# Patient Record
Sex: FEMALE | Race: WHITE | Employment: OTHER | URBAN - METROPOLITAN AREA
[De-identification: names, ages, dates, MRNs, and addresses within clinical notes are randomized per-mention and may not be internally consistent; named-entity substitution may affect disease eponyms.]

---

## 2020-01-08 ENCOUNTER — OFFICE VISIT (OUTPATIENT)
Dept: ORTHOPEDIC SURGERY | Age: 80
End: 2020-01-08
Payer: MEDICARE

## 2020-01-08 VITALS — TEMPERATURE: 98 F | HEIGHT: 63 IN | BODY MASS INDEX: 24.45 KG/M2 | WEIGHT: 138 LBS

## 2020-01-08 PROCEDURE — G8420 CALC BMI NORM PARAMETERS: HCPCS | Performed by: ORTHOPAEDIC SURGERY

## 2020-01-08 PROCEDURE — 4004F PT TOBACCO SCREEN RCVD TLK: CPT | Performed by: ORTHOPAEDIC SURGERY

## 2020-01-08 PROCEDURE — 1090F PRES/ABSN URINE INCON ASSESS: CPT | Performed by: ORTHOPAEDIC SURGERY

## 2020-01-08 PROCEDURE — 1123F ACP DISCUSS/DSCN MKR DOCD: CPT | Performed by: ORTHOPAEDIC SURGERY

## 2020-01-08 PROCEDURE — 4040F PNEUMOC VAC/ADMIN/RCVD: CPT | Performed by: ORTHOPAEDIC SURGERY

## 2020-01-08 PROCEDURE — 99203 OFFICE O/P NEW LOW 30 MIN: CPT | Performed by: ORTHOPAEDIC SURGERY

## 2020-01-08 PROCEDURE — G8400 PT W/DXA NO RESULTS DOC: HCPCS | Performed by: ORTHOPAEDIC SURGERY

## 2020-01-08 PROCEDURE — G8427 DOCREV CUR MEDS BY ELIG CLIN: HCPCS | Performed by: ORTHOPAEDIC SURGERY

## 2020-01-08 PROCEDURE — 27810 TREATMENT OF ANKLE FRACTURE: CPT | Performed by: ORTHOPAEDIC SURGERY

## 2020-01-08 PROCEDURE — G8484 FLU IMMUNIZE NO ADMIN: HCPCS | Performed by: ORTHOPAEDIC SURGERY

## 2020-01-08 RX ORDER — HYDROCODONE BITARTRATE AND ACETAMINOPHEN 5; 325 MG/1; MG/1
TABLET ORAL
COMMUNITY
Start: 2020-01-04 | End: 2021-02-21 | Stop reason: ALTCHOICE

## 2020-01-08 RX ORDER — HYDROCODONE BITARTRATE AND ACETAMINOPHEN 5; 325 MG/1; MG/1
1 TABLET ORAL EVERY 6 HOURS PRN
Qty: 28 TABLET | Refills: 0 | Status: SHIPPED | OUTPATIENT
Start: 2020-01-08 | End: 2020-01-15

## 2020-01-08 NOTE — PROGRESS NOTES
Chief Complaint   Patient presents with    Ankle Pain     Right ankle, had a fall DOI 1/2/2020, [facility] [address] is a 70 y.o. female who presents today with a right ankle injury. There are injury occurred on 1/2/2020 patient tripped over herself. Patient was initially seen at [facility] and was provided a posterior splint. Patient follows up 1 week since the time of injury. Patient states that no reduction was attempted in the emergency department. But she was provided pain medication. Past Medical History:   Diagnosis Date    Hiatal hernia     Hyperlipidemia     Hypertension      Past Surgical History:   Procedure Laterality Date    BLADDER REPAIR      x3    COLONOSCOPY      ENDOSCOPY, COLON, DIAGNOSTIC      HERNIA REPAIR      HYSTERECTOMY      OTHER SURGICAL HISTORY  11/15/12    tenovaginotomy left ring finger    SKIN BIOPSY      TONSILLECTOMY         Current Outpatient Medications:     HYDROcodone-acetaminophen (NORCO) 5-325 MG per tablet, TAKE ONE TABLET BY MOUTH EVERY 8 HOURS AS NEEDED FOR SEVERE PAIN (PAIN SCALE 7-10), Disp: , Rfl:     HYDROcodone-acetaminophen (NORCO) 5-325 MG per tablet, Take 1 tablet by mouth every 6 hours as needed for Pain for up to 7 days. , Disp: 28 tablet, Rfl: 0    BIOTIN PO, Take by mouth, Disp: , Rfl:     sertraline (ZOLOFT) 100 MG tablet, Take 25 mg by mouth daily , Disp: , Rfl:     amLODIPine (NORVASC) 10 MG tablet, Take 5 mg by mouth 2 times daily. , Disp: , Rfl:     omeprazole (PRILOSEC) 40 MG capsule, Take 40 mg by mouth daily. , Disp: , Rfl:     simvastatin (ZOCOR) 20 MG tablet, Take 20 mg by mouth nightly.  , Disp: , Rfl:     Calcium Carb-Cholecalciferol (CALCIUM 600+D3 PO), Take 1 tablet by mouth 2 times daily. , Disp: , Rfl:     Multiple Vitamins-Minerals (CENTRUM SILVER) TABS, Take 1 tablet by mouth daily.   , Disp: , Rfl:     Vitamin D (CHOLECALCIFEROL) 1000 UNITS CAPS capsule, Take 1,000 Units by mouth 2 times daily.  , Disp: , Rfl:   Allergies   Allergen Reactions    Aspirin Anaphylaxis    Propoxyphene     Darvocet [Propoxyphene N-Acetaminophen] Nausea And Vomiting    Demerol Nausea And Vomiting     Social History     Socioeconomic History    Marital status:      Spouse name: Not on file    Number of children: Not on file    Years of education: Not on file    Highest education level: Not on file   Occupational History    Not on file   Social Needs    Financial resource strain: Not on file    Food insecurity:     Worry: Not on file     Inability: Not on file    Transportation needs:     Medical: Not on file     Non-medical: Not on file   Tobacco Use    Smoking status: Never Smoker   Substance and Sexual Activity    Alcohol use: No    Drug use: Not on file    Sexual activity: Not on file   Lifestyle    Physical activity:     Days per week: Not on file     Minutes per session: Not on file    Stress: Not on file   Relationships    Social connections:     Talks on phone: Not on file     Gets together: Not on file     Attends Holiness service: Not on file     Active member of club or organization: Not on file     Attends meetings of clubs or organizations: Not on file     Relationship status: Not on file    Intimate partner violence:     Fear of current or ex partner: Not on file     Emotionally abused: Not on file     Physically abused: Not on file     Forced sexual activity: Not on file   Other Topics Concern    Not on file   Social History Narrative    Not on file     No family history on file. REVIEW OF SYSTEMS:     General/Constitution:  (-)weight loss, (-)fever, (-)chills, (-)weakness. Skin: (-) rash,(-) psoriasis,(-) eczema, (-)skin cancer. Musculoskeletal: (-) fractures,  (-) dislocations,(-) collagen vascular disease, (-) fibromyalgia, (-) multiple sclerosis, (-) muscular dystrophy, (-) RSD,(-) joint pain (-)swelling, (-) joint pain,swelling.   Neurologic: (-) epilepsy, Musculoskeletal:    Gait: antalgic; examination of the nails and digits reveal no cyanosis or clubbing. Knee exam - bilateral knee exam shows;  range of motion of R. Knee is 0 to 140, and L. Knee is 0 to 140. The patient does not have  pain on motion, there is not an effusion, there is not tenderness over the  global region, there are not any masses, there is not ligamentous instability, there is not  deformity noted. Knee exam: the injured knee reveals normal exam, no swelling, tenderness, instability; ligaments intact, FROM. Ankle Exam:    Upon inspection and palpation of the Right ankle,  there is not deformity noted,  moderate swelling, moderate ecchymosis, has pain on palpation of distal fibula. ROM R: Limited Secondary to pain; Left ankle : DF20; PF 40;  INV 30, MONIE 10. This exam was compared bilaterally. Right Ankle:   (-) Anterior Drawer ,  (-) Posterior Drawer ,  (-) Squeeze test,  (-) External Rotation, (-) Eversion test , (-) Alberto Test     Left ankle:   (-) Anterior Drawer ,(-)  Posterior Drawer ,(-) Squeeze test,(-) External Rotation (-) Eversion test, (-) Alberto Test.      Foot exam- visual inspection reveals warm, good capillary refill, there is not pain to palpation over the metatarsals. ROM inversion/eversion full range of motion, abduction/adduction full range of motion, ROM in MTP/PIP/DIP full range of motion. Xrays:    Ct Ankle Right Wo Contrast    Result Date: 1/8/2020  LOCATION: 200 EXAM: CT ANKLE RIGHT WO CONTRAST COMPARISON: None HISTORY: Ankle fracture TECHNIQUE: Noncontrast CT images obtained of the right ankle without contrast. Coronal and sagittal reformatted images obtained. FINDINGS: Comminuted fracture of the lateral malleolus is identified extending into the tibiofibular joint. There is also a fracture of the posterior malleolus. No fracture of the medial malleolus identified. There is tibiotalar disruption with abnormal alignment.  No other fractures noted. Bimalleolar fracture dislocation involving the posterior and lateral malleolus. Radiographic findings reviewed with patient      Impression:  Edwin Orozco was seen today for ankle pain. Diagnoses and all orders for this visit:    Closed bimalleolar fracture of right ankle, initial encounter  -     CT ANKLE RIGHT WO CONTRAST; Future  -     HYDROcodone-acetaminophen (NORCO) 5-325 MG per tablet; Take 1 tablet by mouth every 6 hours as needed for Pain for up to 7 days. -     Mukesh Betancourt, Jostin Worrell DO, Orthopaedics, [facility]        Patient seen examined. X-rays reviewed. Patient had a bimalleolar equivalent ankle fracture noted on the coronal films with subluxation. Lateral films revealed possible posterior malleolar component. Portion of patient was only provided a posterior splint 1 week ago. Therefore patient was converted to a 3 sided well molded fiberglass splint with manipulation at the fracture sites to maintain alignment. CT scan was therefore ordered today showing persistent mild subluxation. Due to the involvement of the posterior malleolar component, is recommended patient will follow-up with orthopedic trauma for further evaluation management. Patient's pain medication was renewed today. Plan:Natural history and expected course discussed. Questions answered. Rest, ice, compression, elevation (RICE) therapy.

## 2020-01-09 ENCOUNTER — TELEPHONE (OUTPATIENT)
Dept: ORTHOPEDIC SURGERY | Age: 80
End: 2020-01-09

## 2020-01-14 ENCOUNTER — TELEPHONE (OUTPATIENT)
Dept: ORTHOPEDIC SURGERY | Age: 80
End: 2020-01-14

## 2020-03-12 ENCOUNTER — HOSPITAL ENCOUNTER (OUTPATIENT)
Age: 80
Discharge: HOME | End: 2020-03-14
Payer: MEDICARE

## 2020-03-12 PROCEDURE — 81003 URINALYSIS AUTO W/O SCOPE: CPT

## 2020-03-12 PROCEDURE — 87088 URINE BACTERIA CULTURE: CPT

## 2020-03-13 LAB
BILIRUBIN URINE: NEGATIVE
BLOOD, URINE: NEGATIVE
CLARITY: CLEAR
COLOR: YELLOW
GLUCOSE URINE: NEGATIVE MG/DL
KETONES, URINE: NEGATIVE MG/DL
LEUKOCYTE ESTERASE, URINE: NEGATIVE
NITRITE, URINE: NEGATIVE
PH UA: 5.5 (ref 5–9)
PROTEIN UA: NEGATIVE MG/DL
SPECIFIC GRAVITY UA: 1.02 (ref 1–1.03)
UROBILINOGEN, URINE: 0.2 E.U./DL

## 2020-03-14 LAB — URINE CULTURE, ROUTINE: NORMAL

## 2021-02-21 ENCOUNTER — HOSPITAL ENCOUNTER (EMERGENCY)
Age: 81
Discharge: HOME | End: 2021-02-21
Payer: MEDICARE

## 2021-02-21 VITALS
WEIGHT: 142 LBS | DIASTOLIC BLOOD PRESSURE: 88 MMHG | OXYGEN SATURATION: 95 % | RESPIRATION RATE: 20 BRPM | BODY MASS INDEX: 26.13 KG/M2 | HEART RATE: 92 BPM | SYSTOLIC BLOOD PRESSURE: 144 MMHG | HEIGHT: 62 IN | TEMPERATURE: 98.1 F

## 2021-02-21 DIAGNOSIS — J06.9 VIRAL URI: Primary | ICD-10-CM

## 2021-02-21 PROCEDURE — 99211 OFF/OP EST MAY X REQ PHY/QHP: CPT

## 2021-02-21 RX ORDER — PANTOPRAZOLE SODIUM 40 MG/1
40 TABLET, DELAYED RELEASE ORAL DAILY
COMMUNITY

## 2021-02-21 RX ORDER — LOSARTAN POTASSIUM AND HYDROCHLOROTHIAZIDE 12.5; 5 MG/1; MG/1
1 TABLET ORAL DAILY
COMMUNITY

## 2021-02-21 RX ORDER — ALPRAZOLAM 0.25 MG/1
0.25 TABLET ORAL NIGHTLY PRN
COMMUNITY

## 2021-02-21 RX ORDER — LORATADINE 10 MG/1
10 TABLET ORAL DAILY
Qty: 7 TABLET | Refills: 0 | Status: SHIPPED | OUTPATIENT
Start: 2021-02-21 | End: 2021-02-28

## 2021-02-21 ASSESSMENT — PAIN DESCRIPTION - PROGRESSION: CLINICAL_PROGRESSION: GRADUALLY WORSENING

## 2021-02-21 ASSESSMENT — PAIN DESCRIPTION - PAIN TYPE: TYPE: ACUTE PAIN

## 2021-02-21 NOTE — ED PROVIDER NOTES
[address] [facility]  Department of Emergency Medicine   ED  Encounter Note  Admit Date/RoomTime: 2021 12:11 PM  ED Room:     NAME: Jie Londono  : 1940  MRN: [id number]     Chief Complaint:  Pharyngitis, Nasal Congestion, Cough, and Eye Problem (States both eyes watery.)    History of Present Illness       Jie Londono is a 68 y.o. old female who presents to the emergency department complaining of an upper respiratory infection for the past 2 to 3 days. Patient states for a few days now she has had sinus congestion. Nasal drainage. Watery eyes. She has had a little bit of a cough from the drainage down the back of her throat. Patient states she feels foggy in the head. At home she tried an over-the-counter cough and cold medicine x1, did not like it so she did not take it again. She denies any fevers or chills. Denies loss of taste, loss of smell. Denies sore throat. She denies shortness of breath. Does admit to a little bit of nausea the other day. Denies any vomiting or diarrhea. She has no concerns for coronavirus. ROS   Pertinent positives and negatives are stated within HPI, all other systems reviewed and are negative. Past Medical History:  has a past medical history of Depression, Hiatal hernia, Hyperlipidemia, and Hypertension. Surgical History:  has a past surgical history that includes Tonsillectomy; hernia repair; bladder repair; Hysterectomy; Colonoscopy; Endoscopy, colon, diagnostic; skin biopsy; other surgical history (11/15/12); and fracture surgery. Social History:  reports that she has never smoked. She has never used smokeless tobacco. She reports that she does not drink alcohol. Family History: family history is not on file.      Allergies: Aspirin, Propoxyphene, Darvocet [propoxyphene n-acetaminophen], and Demerol    Physical Exam   Oxygen Saturation Interpretation: Normal.        ED Triage Vitals [21 1213]   BP Temp Temp Source Pulse Resp SpO2 Height Weight   (!) 144/88 98.1 °F (36.7 °C) Infrared 92 20 95 % 5' 2\" (1.575 m) 142 lb (64.4 kg)         General:  NAD. Alert and Oriented. Well-appearing. Skin:  Warm, dry. No rashes. Head:  Normocephalic. Atraumatic. Eyes:  EOMI. Conjunctiva normal.  ENT:  Oral mucosa moist.  Airway patent. Posterior pharynx pink without erythema, without swelling, without exudate. Uvula midline with equal rise and without edema. Clear postnasal drainage. Bilateral ear canals patent with minimal cerumen. Bilateral TM's without erythema, without bulging. Nasal turbinates with moderate swelling and clear drainage. Frontal and maxillary sinuses nontender to palpation. Neck:  Supple. Normal ROM. Respiratory:  No respiratory distress. No labored breathing. Lungs clear without rales, rhonchi or wheezing. Cardiovascular:  Regular rate. No Murmur. No peripheral edema. Extremities warm and good color. Extremities:  Normal ROM. Nontender to palpation. Atraumatic. Back:  Normal ROM. Nontender to palpation. Neuro:  Alert and Oriented to person, place, time and situation. Normal LOC. Moves all extremities. Speech fluent. Psych:  Calm and Cooperative. Normal thought process. Normal judgement. Lab / Imaging Results   (All laboratory and radiology results have been personally reviewed by myself)  Labs:  No results found for this visit on 02/21/21. Imaging: All Radiology results interpreted by Radiologist unless otherwise noted. No orders to display       ED Course / Medical Decision Making   Medications - No data to display     Re-examination:  2/21/21       Time:    Patients condition . Consults:   None    Procedures:   none    Medical Decision Making:    Based on low suspicion for pneumonia as per history/physical findings, imaging was not done.  Based on low suspicion for COVID-19, testing was not done.       Upper respiratory infection is likely to  be viral in etiology. Antibiotics are not indicated at this time based on clinical presentation and physical findings. She is not hypoxic. Patient is well appearing, non toxic and appropriate for outpatient management. Patient states she does not like to take any liquid cough or cold medicine. She is willing to try Claritin or guaifenesin tablets. Plan of Care/Counseling:  I reviewed today's visit with the patient in addition to providing specific details for the plan of care and counseling regarding the diagnosis and prognosis. Questions are answered at this time and are agreeable with the plan. Assessment     1. Viral URI New Problem     Plan   Discharge home. Patient condition is good    New Medications     New Prescriptions    LORATADINE (CLARITIN) 10 MG TABLET    Take 1 tablet by mouth daily for 7 days     Electronically signed by WINDY Ross   DD: 2/21/21  **This report was transcribed using voice recognition software. Every effort was made to ensure accuracy; however, inadvertent computerized transcription errors may be present.   END OF ED PROVIDER NOTE         Marquis Ross  02/21/21 [id number]

## 2022-04-03 ENCOUNTER — HOSPITAL ENCOUNTER (EMERGENCY)
Age: 82
Discharge: HOME | End: 2022-04-03
Payer: MEDICARE

## 2022-04-03 ENCOUNTER — APPOINTMENT (OUTPATIENT)
Dept: GENERAL RADIOLOGY | Age: 82
End: 2022-04-03
Payer: MEDICARE

## 2022-04-03 VITALS
RESPIRATION RATE: 16 BRPM | BODY MASS INDEX: 24.69 KG/M2 | WEIGHT: 135 LBS | OXYGEN SATURATION: 98 % | SYSTOLIC BLOOD PRESSURE: 143 MMHG | TEMPERATURE: 97.7 F | HEART RATE: 91 BPM | DIASTOLIC BLOOD PRESSURE: 74 MMHG

## 2022-04-03 DIAGNOSIS — M76.52 PATELLAR TENDINITIS, LEFT KNEE: Primary | ICD-10-CM

## 2022-04-03 PROCEDURE — 73562 X-RAY EXAM OF KNEE 3: CPT

## 2022-04-03 PROCEDURE — 99211 OFF/OP EST MAY X REQ PHY/QHP: CPT

## 2022-04-03 RX ORDER — CEPHALEXIN 500 MG/1
500 CAPSULE ORAL 3 TIMES DAILY
Qty: 30 CAPSULE | Refills: 0 | Status: SHIPPED | OUTPATIENT
Start: 2022-04-03 | End: 2022-04-13

## 2022-04-03 NOTE — ED PROVIDER NOTES
[address] [facility]  Department of Emergency Medicine   ED  Encounter Note  Admit Date/RoomTime: 4/3/2022  1:27 PM  ED Room:   NAME: Ana Galdamez  : 1940  MRN: [id number]     Chief Complaint:  Knee Pain (left knee cap, is red, hot, swollen, noticed it last night, no injury)    HISTORY OF PRESENT ILLNESS        Ana Galdamez is a 80 y.o. female who presents to the ED with complaint of left knee redness and swelling. Patient states yesterday she noted that her left knee had a bump to the front of it. Shortly after that it became very red, warm and swollen. It is tender to the touch. Patient does admit couple days before that she was down on her hands and knees cleaning. She denies any fevers or chills. Denies any body aches. Denies any injury to her left knee. Symptoms are mild in severity. ROS   Pertinent positives and negatives are stated within HPI, all other systems reviewed and are negative. Past Medical History:  has a past medical history of Depression, Hiatal hernia, Hyperlipidemia, and Hypertension. Surgical History:  has a past surgical history that includes Tonsillectomy; hernia repair; bladder repair; Hysterectomy; Colonoscopy; Endoscopy, colon, diagnostic; skin biopsy; other surgical history (11/15/12); and fracture surgery. Social History:  reports that she has never smoked. She has never used smokeless tobacco. She reports that she does not drink alcohol. Family History: family history is not on file. Allergies: Aspirin, Propoxyphene, Darvocet [propoxyphene n-acetaminophen], and Demerol    PHYSICAL EXAM   Oxygen Saturation Interpretation: Normal on room air analysis. ED Triage Vitals   BP Temp Temp src Pulse Resp SpO2 Height Weight   22 1254 22 1254 -- 22 1254 22 1254 22 1254 -- 22 1252   (!) 143/74 97.7 °F (36.5 °C)  91 16 98 %  135 lb (61.2 kg)       General:  NAD. Alert and Oriented. Well-appearing.   Skin: Warm, dry. No rashes. Head:  Normocephalic. Atraumatic. Eyes:  EOMI. Conjunctiva normal.  ENT:  Oral mucosa moist.  Airway patent. Neck:  Supple. Normal ROM. Respiratory:  No respiratory distress. No labored breathing. Lungs clear without rales, rhonchi or wheezing. Cardiovascular:  Regular rate. No peripheral edema. Extremities warm and good color. Extremities:    Left knee with anterior swelling overlying the patella and patellar tendon. It is swollen and tender to palpation. Warm to the touch. Left knee overall is not swollen and there is no effusion or ballottement. Flexion and extension is intact to the left knee. Left calf is not swollen and nontender to palpation. Back:  Normal ROM. Nontender to palpation. Neuro:  Alert and Oriented to person, place, time and situation. Normal LOC. Moves all extremities. Speech fluent. Psych:  Calm and Cooperative. Normal thought process. Normal judgement. Lab / Imaging Results   (All laboratory and radiology results have been personally reviewed by myself)  Labs:  No results found for this visit on 04/03/22. Imaging: All Radiology results interpreted by Radiologist unless otherwise noted. XR KNEE LEFT (3 VIEWS)   Final Result   No acute abnormality of the knee. ED Course / Medical Decision Making   Medications - No data to display     Re-examination:  4/3/22       Time:   Patients condition . Consult(s):   None    Procedure(s):   none    MDM:   Discussed with patient that this looks like a patellar tendinitis. I will cover her on Keflex antibiotic. With her aspirin allergy patient states she cannot take any Motrin products. Advised to rest, ice and elevation. Plan of Care/Counseling:  Physician Assistant on duty reviewed today's visit with the patient in addition to providing specific details for the plan of care and counseling regarding the diagnosis and prognosis.   Questions are answered at this time and are agreeable with the plan. ASSESSMENT     1. Patellar tendinitis, left knee New Problem     PLAN   Discharged home. Patient condition is good    New Medications     New Prescriptions    CEPHALEXIN (KEFLEX) 500 MG CAPSULE    Take 1 capsule by mouth 3 times daily for 10 days     Electronically signed by IWNDY Zelaya   DD: 4/3/22  **This report was transcribed using voice recognition software. Every effort was made to ensure accuracy; however, inadvertent computerized transcription errors may be present.   END OF ED PROVIDER NOTE       Marquis Zelaya  04/03/22 [id number]

## 2022-10-13 ENCOUNTER — INITIAL CONSULT (OUTPATIENT)
Dept: NEUROSURGERY | Age: 82
End: 2022-10-13
Payer: MEDICARE

## 2022-10-13 VITALS
OXYGEN SATURATION: 95 % | WEIGHT: 135 LBS | TEMPERATURE: 97.1 F | SYSTOLIC BLOOD PRESSURE: 129 MMHG | DIASTOLIC BLOOD PRESSURE: 78 MMHG | BODY MASS INDEX: 24.84 KG/M2 | RESPIRATION RATE: 20 BRPM | HEIGHT: 62 IN | HEART RATE: 109 BPM

## 2022-10-13 DIAGNOSIS — M54.41 ACUTE MIDLINE LOW BACK PAIN WITH BILATERAL SCIATICA: Primary | ICD-10-CM

## 2022-10-13 DIAGNOSIS — M54.42 ACUTE MIDLINE LOW BACK PAIN WITH BILATERAL SCIATICA: Primary | ICD-10-CM

## 2022-10-13 PROCEDURE — G8427 DOCREV CUR MEDS BY ELIG CLIN: HCPCS | Performed by: NEUROLOGICAL SURGERY

## 2022-10-13 PROCEDURE — G8484 FLU IMMUNIZE NO ADMIN: HCPCS | Performed by: NEUROLOGICAL SURGERY

## 2022-10-13 PROCEDURE — 99204 OFFICE O/P NEW MOD 45 MIN: CPT | Performed by: NEUROLOGICAL SURGERY

## 2022-10-13 PROCEDURE — 1036F TOBACCO NON-USER: CPT | Performed by: NEUROLOGICAL SURGERY

## 2022-10-13 PROCEDURE — 99202 OFFICE O/P NEW SF 15 MIN: CPT

## 2022-10-13 PROCEDURE — 1090F PRES/ABSN URINE INCON ASSESS: CPT | Performed by: NEUROLOGICAL SURGERY

## 2022-10-13 PROCEDURE — G8420 CALC BMI NORM PARAMETERS: HCPCS | Performed by: NEUROLOGICAL SURGERY

## 2022-10-13 PROCEDURE — G8400 PT W/DXA NO RESULTS DOC: HCPCS | Performed by: NEUROLOGICAL SURGERY

## 2022-10-13 PROCEDURE — 1123F ACP DISCUSS/DSCN MKR DOCD: CPT | Performed by: NEUROLOGICAL SURGERY

## 2022-10-13 RX ORDER — SIMVASTATIN 80 MG
TABLET ORAL
COMMUNITY
Start: 2022-09-09

## 2022-10-13 RX ORDER — VALSARTAN AND HYDROCHLOROTHIAZIDE 80; 12.5 MG/1; MG/1
TABLET, FILM COATED ORAL
COMMUNITY
Start: 2022-10-10

## 2022-10-13 ASSESSMENT — ENCOUNTER SYMPTOMS
EYES NEGATIVE: 1
GASTROINTESTINAL NEGATIVE: 1
BACK PAIN: 1
RESPIRATORY NEGATIVE: 1
ALLERGIC/IMMUNOLOGIC NEGATIVE: 1

## 2022-10-13 NOTE — PROGRESS NOTES
Flex Stoner (:  1940) is a [de-identified] y.o. female,New patient, here for evaluation of the following chief complaint(s):  Back Pain (Back pain is constant, pt has had PT and injections years ago with no relief)         ASSESSMENT/PLAN:  1. Acute midline low back pain with bilateral sciatica  [de-identified]year old lady who presents with back and leg pain. She has failed over 3 months of physical therapy and epidural injections. 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 I am recommending an L4-L5 laminectomy and right diskectomy       No follow-ups on file. Subjective   SUBJECTIVE/OBJECTIVE:  HPI  [de-identified]year old lady who presents with back and bilateral leg pain. The pain is made worse with activity and better with rest.  The pain is rated as a 9/10. She admits to numbness and tinging in both legs. She denies loss of control of bowel or bladder function. She has tried physical therapy and epidural steroid injections. The pain is worse in the morning    Review of Systems   Constitutional: Negative. HENT: Negative. Eyes: Negative. Respiratory: Negative. Cardiovascular: Negative. Gastrointestinal: Negative. Endocrine: Negative. Genitourinary: Negative. Musculoskeletal:  Positive for back pain and joint swelling. Skin: Negative. Allergic/Immunologic: Negative. Neurological: Negative. Hematological:  Bruises/bleeds easily. Objective   Physical Exam  HENT:      Head: Normocephalic and atraumatic. Nose: Nose normal.   Eyes:      General: No visual field deficit. Right eye: No discharge. Left eye: No discharge. Extraocular Movements: Extraocular movements intact. Conjunctiva/sclera: Conjunctivae normal.      Pupils: Pupils are equal, round, and reactive to light. Pulmonary:      Effort: Pulmonary effort is normal. No respiratory distress. Abdominal:      General: Abdomen is flat. There is no distension.    Musculoskeletal: General: No swelling, tenderness, deformity or signs of injury. Normal range of motion. Right lower leg: No edema. Skin:     Capillary Refill: Capillary refill takes less than 2 seconds. Coloration: Skin is not jaundiced or pale. Findings: No bruising, erythema, lesion or rash. Neurological:      General: No focal deficit present. Mental Status: She is oriented to person, place, and time. Cranial Nerves: Cranial nerves 2-12 are intact. No cranial nerve deficit, dysarthria or facial asymmetry. Sensory: Sensation is intact. No sensory deficit. Motor: Weakness present. No tremor, atrophy, abnormal muscle tone, seizure activity or pronator drift. Coordination: Coordination is intact. Romberg sign negative. Coordination normal.      Gait: Gait normal.      Deep Tendon Reflexes: Reflexes normal. Babinski sign absent on the right side. Babinski sign absent on the left side. Reflex Scores:       Tricep reflexes are 2+ on the right side and 2+ on the left side. Bicep reflexes are 2+ on the right side and 2+ on the left side. Brachioradialis reflexes are 2+ on the right side and 2+ on the left side. Patellar reflexes are 2+ on the right side and 2+ on the left side. Achilles reflexes are 2+ on the right side and 2+ on the left side. Comments: 4/5 in RLE   Psychiatric:         Mood and Affect: Mood normal.         Behavior: Behavior normal.         Thought Content: Thought content normal.         Judgment: Judgment normal.            On this date 10/13/2022 I have spent 45 minutes reviewing previous notes, test results and face to face with the patient discussing the diagnosis and importance of compliance with the treatment plan as well as documenting on the day of the visit. An electronic signature was used to authenticate this note.     --Ludy Titus MD

## 2022-10-19 ENCOUNTER — PREP FOR PROCEDURE (OUTPATIENT)
Dept: NEUROSURGERY | Age: 82
End: 2022-10-19

## 2022-10-19 PROBLEM — M51.16 LUMBAR DISC HERNIATION WITH RADICULOPATHY: Status: ACTIVE | Noted: 2022-10-19

## 2022-10-24 ENCOUNTER — PREP FOR PROCEDURE (OUTPATIENT)
Dept: NEUROSURGERY | Age: 82
End: 2022-10-24

## 2022-10-24 DIAGNOSIS — Z01.818 PRE-OP TESTING: Primary | ICD-10-CM

## 2022-10-24 RX ORDER — SODIUM CHLORIDE 0.9 % (FLUSH) 0.9 %
5-40 SYRINGE (ML) INJECTION EVERY 12 HOURS SCHEDULED
Status: CANCELLED | OUTPATIENT
Start: 2022-10-24

## 2022-10-24 RX ORDER — SODIUM CHLORIDE 9 MG/ML
INJECTION, SOLUTION INTRAVENOUS PRN
Status: CANCELLED | OUTPATIENT
Start: 2022-10-24

## 2022-10-24 RX ORDER — SODIUM CHLORIDE 0.9 % (FLUSH) 0.9 %
5-40 SYRINGE (ML) INJECTION PRN
Status: CANCELLED | OUTPATIENT
Start: 2022-10-24

## 2022-10-24 RX ORDER — SODIUM CHLORIDE 9 MG/ML
INJECTION, SOLUTION INTRAVENOUS CONTINUOUS
Status: CANCELLED | OUTPATIENT
Start: 2022-10-24

## 2022-11-08 RX ORDER — BUPROPION HYDROCHLORIDE 150 MG/1
150 TABLET ORAL EVERY EVENING
COMMUNITY

## 2022-11-08 RX ORDER — ASCORBIC ACID 500 MG
1000 TABLET ORAL DAILY
COMMUNITY

## 2022-11-08 NOTE — PROGRESS NOTES
[address]   PRE-ADMISSION TESTING GENERAL INSTRUCTIONS  PAT Phone Number: [phone]      GENERAL INSTRUCTIONS:    [x] Antibacterial Soap Shower Night before and/or AM of surgery. [x] Do not wear makeup, lotions, powders, deodorant. [x] Nothing by mouth after midnight; including gum, candy, mints, or water. [x] You may brush your teeth, gargle, but do NOT swallow water. [x] No tobacco products, illegal drugs, or alcohol within 24 hours of your surgery. [x] Jewelry or valuables should not be brought to the hospital. All body and/or tongue piercing's must be removed prior to arriving to hospital. No contact lens or hair pins. [x] Arrange transportation with a responsible adult  to and from the hospital. Arrange for someone to be with you for the remainder of the day and for 24 hours after your procedure due to having had anesthesia. -Who will be your  for transportation? Shawn Hess, spouse, [place], Daughter        -Who will be staying with you for 24 hrs after your procedure? Shawn Hess, boyfriend  [x] [employer] card and photo ID. [x] Bring copy of living will or healthcare power of  papers to be placed in your electronic record. [x] Transfusion Bracelet: Please bring with you to hospital, day of surgery. PARKING INSTRUCTIONS:     [x] ARRIVAL DATE & TIME: 11/21 at 8 am  [x] Enter into the [employer]. Two people may accompany you. Masks are not required but are recommended. [x] Parking Lot \"I\" is where you will park. It is located on the corner of [address] and [address]. The entrance is on [address]. Upon entering the parking lot, a voucher ticket will print    EDUCATION INSTRUCTIONS:      [x] Pre-admission Testing educational folder given  [x] Incentive Spirometry,coughing & deep breathing exercises reviewed. [x] Medication information sheet(s)   [x] Fluoroscopy-Xray used in surgery reviewed with patient. Educational pamphlet placed in chart. [x] Pain: Post-op pain is normal and to be expected. You will be asked to rate your pain from 0-10. MEDICATION INSTRUCTIONS:    [x] Bring a complete list of your medications, please write the last time you took the medicine, give this list to the nurse in Pre-Op. [x] Take only the following medications the morning of surgery with 1-2 ounces of water: zoloft, amlodipine, protonix  [x] Stop all herbal supplements and vitamins 5 days before surgery. Stop NSAIDS 7 days before surgery. [x] Follow physician instructions regarding any blood thinners you may be taking. WHAT TO EXPECT:    [x] The day of surgery you will be greeted and checked in by the [employer].  In addition, you will be registered in the [facility] by a Patient Access Representative. Please bring your photo ID and insurance card. A nurse will greet you in accordance to the time you are needed in the pre-op area to prepare you for surgery. Please do not be discouraged if you are not greeted in the order you arrive as there are many variables that are involved in patient preparation. Your patience is greatly appreciated as you wait for your nurse. Please bring in items such as: books, magazines, newspapers, electronics, or any other items  to occupy your time in the waiting area. [x]  Delays may occur with surgery and staff will make a sincere effort to keep you informed of delays. If any delays occur with your procedure, we apologize ahead of time for your inconvenience as we recognize the value of your time.

## 2022-11-14 ENCOUNTER — HOSPITAL ENCOUNTER (OUTPATIENT)
Dept: PREADMISSION TESTING | Age: 82
Discharge: HOME | End: 2022-11-14
Payer: MEDICARE

## 2022-11-14 ENCOUNTER — HOSPITAL ENCOUNTER (OUTPATIENT)
Dept: GENERAL RADIOLOGY | Age: 82
Discharge: HOME | End: 2022-11-16
Payer: MEDICARE

## 2022-11-14 VITALS
DIASTOLIC BLOOD PRESSURE: 63 MMHG | BODY MASS INDEX: 25.09 KG/M2 | RESPIRATION RATE: 20 BRPM | WEIGHT: 137.2 LBS | OXYGEN SATURATION: 95 % | HEART RATE: 83 BPM | SYSTOLIC BLOOD PRESSURE: 139 MMHG | TEMPERATURE: 98.1 F

## 2022-11-14 DIAGNOSIS — Z01.818 PRE-OP TESTING: ICD-10-CM

## 2022-11-14 LAB
ABO/RH: NORMAL
ANION GAP SERPL CALCULATED.3IONS-SCNC: 9 MMOL/L (ref 7–16)
ANTIBODY SCREEN: NORMAL
BACTERIA: ABNORMAL /HPF
BASOPHILS ABSOLUTE: 0.05 E9/L (ref 0–0.2)
BASOPHILS RELATIVE PERCENT: 0.7 % (ref 0–2)
BILIRUBIN URINE: NEGATIVE
BLOOD, URINE: NEGATIVE
BUN BLDV-MCNC: 15 MG/DL (ref 6–23)
CALCIUM SERPL-MCNC: 10.4 MG/DL (ref 8.6–10.2)
CHLORIDE BLD-SCNC: 99 MMOL/L (ref 98–107)
CLARITY: CLEAR
CO2: 30 MMOL/L (ref 22–29)
COLOR: YELLOW
CREAT SERPL-MCNC: 0.8 MG/DL (ref 0.5–1)
EOSINOPHILS ABSOLUTE: 0.17 E9/L (ref 0.05–0.5)
EOSINOPHILS RELATIVE PERCENT: 2.4 % (ref 0–6)
GFR SERPL CREATININE-BSD FRML MDRD: >60 ML/MIN/1.73
GLUCOSE BLD-MCNC: 101 MG/DL (ref 74–99)
GLUCOSE URINE: NEGATIVE MG/DL
HCT VFR BLD CALC: 43.1 % (ref 34–48)
HEMOGLOBIN: 14.1 G/DL (ref 11.5–15.5)
IMMATURE GRANULOCYTES #: 0.02 E9/L
IMMATURE GRANULOCYTES %: 0.3 % (ref 0–5)
INR BLD: 1
KETONES, URINE: NEGATIVE MG/DL
LEUKOCYTE ESTERASE, URINE: ABNORMAL
LYMPHOCYTES ABSOLUTE: 1.96 E9/L (ref 1.5–4)
LYMPHOCYTES RELATIVE PERCENT: 27.1 % (ref 20–42)
MCH RBC QN AUTO: 29.2 PG (ref 26–35)
MCHC RBC AUTO-ENTMCNC: 32.7 % (ref 32–34.5)
MCV RBC AUTO: 89.2 FL (ref 80–99.9)
MONOCYTES ABSOLUTE: 0.63 E9/L (ref 0.1–0.95)
MONOCYTES RELATIVE PERCENT: 8.7 % (ref 2–12)
NEUTROPHILS ABSOLUTE: 4.39 E9/L (ref 1.8–7.3)
NEUTROPHILS RELATIVE PERCENT: 60.8 % (ref 43–80)
NITRITE, URINE: NEGATIVE
PDW BLD-RTO: 12.5 FL (ref 11.5–15)
PH UA: 7 (ref 5–9)
PLATELET # BLD: 320 E9/L (ref 130–450)
PMV BLD AUTO: 9.8 FL (ref 7–12)
POTASSIUM REFLEX MAGNESIUM: 4.4 MMOL/L (ref 3.5–5)
PROTEIN UA: NEGATIVE MG/DL
PROTHROMBIN TIME: 10.7 SEC (ref 9.3–12.4)
RBC # BLD: 4.83 E12/L (ref 3.5–5.5)
RBC UA: ABNORMAL /HPF (ref 0–2)
SODIUM BLD-SCNC: 138 MMOL/L (ref 132–146)
SPECIFIC GRAVITY UA: 1.02 (ref 1–1.03)
UROBILINOGEN, URINE: 0.2 E.U./DL
WBC # BLD: 7.2 E9/L (ref 4.5–11.5)
WBC UA: ABNORMAL /HPF (ref 0–5)

## 2022-11-14 PROCEDURE — 86900 BLOOD TYPING SEROLOGIC ABO: CPT

## 2022-11-14 PROCEDURE — 85025 COMPLETE CBC W/AUTO DIFF WBC: CPT

## 2022-11-14 PROCEDURE — 80048 BASIC METABOLIC PNL TOTAL CA: CPT

## 2022-11-14 PROCEDURE — 87088 URINE BACTERIA CULTURE: CPT

## 2022-11-14 PROCEDURE — 81001 URINALYSIS AUTO W/SCOPE: CPT

## 2022-11-14 PROCEDURE — 86850 RBC ANTIBODY SCREEN: CPT

## 2022-11-14 PROCEDURE — 36415 COLL VENOUS BLD VENIPUNCTURE: CPT

## 2022-11-14 PROCEDURE — 71046 X-RAY EXAM CHEST 2 VIEWS: CPT

## 2022-11-14 PROCEDURE — 86901 BLOOD TYPING SEROLOGIC RH(D): CPT

## 2022-11-14 PROCEDURE — 85610 PROTHROMBIN TIME: CPT

## 2022-11-16 LAB — URINE CULTURE, ROUTINE: NORMAL

## 2022-11-18 NOTE — H&P
Klaudia Gifford (:  1940) is a [de-identified] y.o. female,New patient, here for evaluation of the following chief complaint(s):  Back Pain (Back pain is constant, pt has had PT and injections years ago with no relief)        ASSESSMENT/PLAN:  1. Acute midline low back pain with bilateral sciatica  [de-identified]year old lady who presents with back and leg pain. She has failed over 3 months of physical therapy and epidural injections. 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 I am recommending an L4-L5 laminectomy and right diskectomy. R/B/A of surgery have been discussed and she wishes to proceed        No follow-ups on file. Subjective   SUBJECTIVE/OBJECTIVE:  HPI  [de-identified]year old lady who presents with back and bilateral leg pain. The pain is made worse with activity and better with rest.  The pain is rated as a 9/10. She admits to numbness and tinging in both legs. She denies loss of control of bowel or bladder function. She has tried physical therapy and epidural steroid injections. The pain is worse in the morning    Past Medical History:   Diagnosis Date    Depression     Hiatal hernia     Hyperlipidemia     Hypertension      Past Surgical History:   Procedure Laterality Date    BLADDER REPAIR      x3    COLONOSCOPY      ENDOSCOPY, COLON, DIAGNOSTIC      FRACTURE SURGERY      right ankle    HERNIA REPAIR      HYSTERECTOMY (CERVIX STATUS UNKNOWN)      OTHER SURGICAL HISTORY  11/15/12    tenovaginotomy left ring finger    SKIN BIOPSY      TONSILLECTOMY       Social History     Socioeconomic History    Marital status:       Spouse name: Not on file    Number of children: Not on file    Years of education: Not on file    Highest education level: Not on file   Occupational History    Not on file   Tobacco Use    Smoking status: Never    Smokeless tobacco: Never   Substance and Sexual Activity    Alcohol use: No    Drug use: Not on file    Sexual activity: Not on file   Other Topics Concern    Not on file Social History Narrative    Not on file     Social Determinants of Health     Financial Resource Strain: Not on file   Food Insecurity: Not on file   Transportation Needs: Not on file   Physical Activity: Not on file   Stress: Not on file   Social Connections: Not on file   Intimate Partner Violence: Not on file   Housing Stability: Not on file     History reviewed. No pertinent family history. Scheduled Meds:  Continuous Infusions:  PRN Meds:. Allergies   Allergen Reactions    Aspirin Anaphylaxis    Propoxyphene     Darvocet [Propoxyphene N-Acetaminophen] Nausea And Vomiting    Demerol Nausea And Vomiting          Review of Systems   Constitutional: Negative. HENT: Negative. Eyes: Negative. Respiratory: Negative. Cardiovascular: Negative. Gastrointestinal: Negative. Endocrine: Negative. Genitourinary: Negative. Musculoskeletal:  Positive for back pain and joint swelling. Skin: Negative. Allergic/Immunologic: Negative. Neurological: Negative. Hematological:  Bruises/bleeds easily. Objective   Physical Exam  HENT:      Head: Normocephalic and atraumatic. Nose: Nose normal.   Eyes:      General: No visual field deficit. Right eye: No discharge. Left eye: No discharge. Extraocular Movements: Extraocular movements intact. Conjunctiva/sclera: Conjunctivae normal.      Pupils: Pupils are equal, round, and reactive to light. Pulmonary:      Effort: Pulmonary effort is normal. No respiratory distress. Abdominal:      General: Abdomen is flat. There is no distension. Musculoskeletal:         General: No swelling, tenderness, deformity or signs of injury. Normal range of motion. Right lower leg: No edema. Skin:     Capillary Refill: Capillary refill takes less than 2 seconds. Coloration: Skin is not jaundiced or pale. Findings: No bruising, erythema, lesion or rash. Neurological:      General: No focal deficit present. Mental Status: She is oriented to person, place, and time. Cranial Nerves: Cranial nerves 2-12 are intact. No cranial nerve deficit, dysarthria or facial asymmetry. Sensory: Sensation is intact. No sensory deficit. Motor: Weakness present. No tremor, atrophy, abnormal muscle tone, seizure activity or pronator drift. Coordination: Coordination is intact. Romberg sign negative. Coordination normal.      Gait: Gait normal.      Deep Tendon Reflexes: Reflexes normal. Babinski sign absent on the right side. Babinski sign absent on the left side. Reflex Scores:       Tricep reflexes are 2+ on the right side and 2+ on the left side. Bicep reflexes are 2+ on the right side and 2+ on the left side. Brachioradialis reflexes are 2+ on the right side and 2+ on the left side. Patellar reflexes are 2+ on the right side and 2+ on the left side. Achilles reflexes are 2+ on the right side and 2+ on the left side. Comments: 4/5 in RLE   Psychiatric:         Mood and Affect: Mood normal.         Behavior: Behavior normal.         Thought Content:  Thought content normal.         Judgment: Judgment normal.

## 2022-11-21 ENCOUNTER — ANESTHESIA EVENT (OUTPATIENT)
Dept: OPERATING ROOM | Age: 82
End: 2022-11-21
Payer: MEDICARE

## 2022-11-21 ENCOUNTER — ANESTHESIA (OUTPATIENT)
Dept: OPERATING ROOM | Age: 82
End: 2022-11-21
Payer: MEDICARE

## 2022-11-21 ENCOUNTER — HOSPITAL ENCOUNTER (OUTPATIENT)
Dept: GENERAL RADIOLOGY | Age: 82
Discharge: HOME | End: 2022-11-23

## 2022-11-21 ENCOUNTER — HOSPITAL ENCOUNTER (OUTPATIENT)
Age: 82
Setting detail: OUTPATIENT SURGERY
Discharge: HOME | End: 2022-11-21
Attending: NEUROLOGICAL SURGERY | Admitting: NEUROLOGICAL SURGERY
Payer: MEDICARE

## 2022-11-21 VITALS
BODY MASS INDEX: 25.21 KG/M2 | TEMPERATURE: 97 F | OXYGEN SATURATION: 97 % | RESPIRATION RATE: 17 BRPM | DIASTOLIC BLOOD PRESSURE: 90 MMHG | WEIGHT: 137 LBS | HEIGHT: 62 IN | HEART RATE: 100 BPM | SYSTOLIC BLOOD PRESSURE: 121 MMHG

## 2022-11-21 DIAGNOSIS — M54.50 CHRONIC LOW BACK PAIN, UNSPECIFIED BACK PAIN LATERALITY, UNSPECIFIED WHETHER SCIATICA PRESENT: ICD-10-CM

## 2022-11-21 DIAGNOSIS — G89.29 CHRONIC LOW BACK PAIN, UNSPECIFIED BACK PAIN LATERALITY, UNSPECIFIED WHETHER SCIATICA PRESENT: ICD-10-CM

## 2022-11-21 DIAGNOSIS — M48.061 STENOSIS, SPINAL, LUMBAR: ICD-10-CM

## 2022-11-21 DIAGNOSIS — M51.16 LUMBAR DISC HERNIATION WITH RADICULOPATHY: ICD-10-CM

## 2022-11-21 PROCEDURE — A4217 STERILE WATER/SALINE, 500 ML: HCPCS | Performed by: NEUROLOGICAL SURGERY

## 2022-11-21 PROCEDURE — 6360000002 HC RX W HCPCS: Performed by: STUDENT IN AN ORGANIZED HEALTH CARE EDUCATION/TRAINING PROGRAM

## 2022-11-21 PROCEDURE — 2709999900 HC NON-CHARGEABLE SUPPLY: Performed by: NEUROLOGICAL SURGERY

## 2022-11-21 PROCEDURE — 2500000003 HC RX 250 WO HCPCS: Performed by: NEUROLOGICAL SURGERY

## 2022-11-21 PROCEDURE — 6370000000 HC RX 637 (ALT 250 FOR IP): Performed by: NEUROLOGICAL SURGERY

## 2022-11-21 PROCEDURE — 3600000014 HC SURGERY LEVEL 4 ADDTL 15MIN: Performed by: NEUROLOGICAL SURGERY

## 2022-11-21 PROCEDURE — 7100000000 HC PACU RECOVERY - FIRST 15 MIN: Performed by: NEUROLOGICAL SURGERY

## 2022-11-21 PROCEDURE — 7100000011 HC PHASE II RECOVERY - ADDTL 15 MIN: Performed by: NEUROLOGICAL SURGERY

## 2022-11-21 PROCEDURE — 2720000010 HC SURG SUPPLY STERILE: Performed by: NEUROLOGICAL SURGERY

## 2022-11-21 PROCEDURE — 3600000004 HC SURGERY LEVEL 4 BASE: Performed by: NEUROLOGICAL SURGERY

## 2022-11-21 PROCEDURE — 63030 LAMOT DCMPRN NRV RT 1 LMBR: CPT | Performed by: NEUROLOGICAL SURGERY

## 2022-11-21 PROCEDURE — 7100000001 HC PACU RECOVERY - ADDTL 15 MIN: Performed by: NEUROLOGICAL SURGERY

## 2022-11-21 PROCEDURE — 3209999900 FLUORO FOR SURGICAL PROCEDURES

## 2022-11-21 PROCEDURE — 3700000001 HC ADD 15 MINUTES (ANESTHESIA): Performed by: NEUROLOGICAL SURGERY

## 2022-11-21 PROCEDURE — 3700000000 HC ANESTHESIA ATTENDED CARE: Performed by: NEUROLOGICAL SURGERY

## 2022-11-21 PROCEDURE — 6360000002 HC RX W HCPCS: Performed by: NEUROLOGICAL SURGERY

## 2022-11-21 PROCEDURE — 2580000003 HC RX 258: Performed by: STUDENT IN AN ORGANIZED HEALTH CARE EDUCATION/TRAINING PROGRAM

## 2022-11-21 PROCEDURE — 2500000003 HC RX 250 WO HCPCS: Performed by: NURSE ANESTHETIST, CERTIFIED REGISTERED

## 2022-11-21 PROCEDURE — 7100000010 HC PHASE II RECOVERY - FIRST 15 MIN: Performed by: NEUROLOGICAL SURGERY

## 2022-11-21 PROCEDURE — 2580000003 HC RX 258: Performed by: NEUROLOGICAL SURGERY

## 2022-11-21 PROCEDURE — 6360000002 HC RX W HCPCS: Performed by: NURSE ANESTHETIST, CERTIFIED REGISTERED

## 2022-11-21 RX ORDER — OXYCODONE HYDROCHLORIDE AND ACETAMINOPHEN 5; 325 MG/1; MG/1
1 TABLET ORAL
Status: COMPLETED | OUTPATIENT
Start: 2022-11-21 | End: 2022-11-21

## 2022-11-21 RX ORDER — SODIUM CHLORIDE 9 MG/ML
INJECTION, SOLUTION INTRAVENOUS PRN
Status: DISCONTINUED | OUTPATIENT
Start: 2022-11-21 | End: 2022-11-21 | Stop reason: HOSPADM

## 2022-11-21 RX ORDER — PROPOFOL 10 MG/ML
INJECTION, EMULSION INTRAVENOUS PRN
Status: DISCONTINUED | OUTPATIENT
Start: 2022-11-21 | End: 2022-11-21 | Stop reason: SDUPTHER

## 2022-11-21 RX ORDER — OXYCODONE HYDROCHLORIDE AND ACETAMINOPHEN 5; 325 MG/1; MG/1
1 TABLET ORAL EVERY 4 HOURS PRN
Qty: 42 TABLET | Refills: 0 | Status: SHIPPED | OUTPATIENT
Start: 2022-11-21 | End: 2022-11-24

## 2022-11-21 RX ORDER — SODIUM CHLORIDE 0.9 % (FLUSH) 0.9 %
5-40 SYRINGE (ML) INJECTION EVERY 12 HOURS SCHEDULED
Status: DISCONTINUED | OUTPATIENT
Start: 2022-11-21 | End: 2022-11-21 | Stop reason: HOSPADM

## 2022-11-21 RX ORDER — LIDOCAINE HYDROCHLORIDE 20 MG/ML
INJECTION, SOLUTION INTRAVENOUS PRN
Status: DISCONTINUED | OUTPATIENT
Start: 2022-11-21 | End: 2022-11-21 | Stop reason: SDUPTHER

## 2022-11-21 RX ORDER — VANCOMYCIN HYDROCHLORIDE 500 MG/10ML
INJECTION, POWDER, LYOPHILIZED, FOR SOLUTION INTRAVENOUS PRN
Status: DISCONTINUED | OUTPATIENT
Start: 2022-11-21 | End: 2022-11-21 | Stop reason: ALTCHOICE

## 2022-11-21 RX ORDER — BUPIVACAINE HYDROCHLORIDE 2.5 MG/ML
INJECTION, SOLUTION EPIDURAL; INFILTRATION; INTRACAUDAL PRN
Status: DISCONTINUED | OUTPATIENT
Start: 2022-11-21 | End: 2022-11-21 | Stop reason: ALTCHOICE

## 2022-11-21 RX ORDER — FENTANYL CITRATE 50 UG/ML
INJECTION, SOLUTION INTRAMUSCULAR; INTRAVENOUS PRN
Status: DISCONTINUED | OUTPATIENT
Start: 2022-11-21 | End: 2022-11-21 | Stop reason: SDUPTHER

## 2022-11-21 RX ORDER — ONDANSETRON 2 MG/ML
INJECTION INTRAMUSCULAR; INTRAVENOUS PRN
Status: DISCONTINUED | OUTPATIENT
Start: 2022-11-21 | End: 2022-11-21 | Stop reason: SDUPTHER

## 2022-11-21 RX ORDER — MEPERIDINE HYDROCHLORIDE 25 MG/ML
12.5 INJECTION INTRAMUSCULAR; INTRAVENOUS; SUBCUTANEOUS
Status: DISCONTINUED | OUTPATIENT
Start: 2022-11-21 | End: 2022-11-21 | Stop reason: HOSPADM

## 2022-11-21 RX ORDER — DEXAMETHASONE SODIUM PHOSPHATE 10 MG/ML
INJECTION INTRAMUSCULAR; INTRAVENOUS PRN
Status: DISCONTINUED | OUTPATIENT
Start: 2022-11-21 | End: 2022-11-21 | Stop reason: SDUPTHER

## 2022-11-21 RX ORDER — SODIUM CHLORIDE 0.9 % (FLUSH) 0.9 %
5-40 SYRINGE (ML) INJECTION PRN
Status: DISCONTINUED | OUTPATIENT
Start: 2022-11-21 | End: 2022-11-21 | Stop reason: HOSPADM

## 2022-11-21 RX ORDER — NEOSTIGMINE METHYLSULFATE 1 MG/ML
INJECTION, SOLUTION INTRAVENOUS PRN
Status: DISCONTINUED | OUTPATIENT
Start: 2022-11-21 | End: 2022-11-21 | Stop reason: SDUPTHER

## 2022-11-21 RX ORDER — MIDAZOLAM HYDROCHLORIDE 1 MG/ML
INJECTION INTRAMUSCULAR; INTRAVENOUS PRN
Status: DISCONTINUED | OUTPATIENT
Start: 2022-11-21 | End: 2022-11-21 | Stop reason: SDUPTHER

## 2022-11-21 RX ORDER — LIDOCAINE HYDROCHLORIDE AND EPINEPHRINE 5; 5 MG/ML; UG/ML
INJECTION, SOLUTION INFILTRATION; PERINEURAL PRN
Status: DISCONTINUED | OUTPATIENT
Start: 2022-11-21 | End: 2022-11-21 | Stop reason: ALTCHOICE

## 2022-11-21 RX ORDER — TIZANIDINE 4 MG/1
4 TABLET ORAL EVERY 6 HOURS PRN
Qty: 40 TABLET | Refills: 0 | Status: SHIPPED | OUTPATIENT
Start: 2022-11-21

## 2022-11-21 RX ORDER — GLYCOPYRROLATE 0.2 MG/ML
INJECTION INTRAMUSCULAR; INTRAVENOUS PRN
Status: DISCONTINUED | OUTPATIENT
Start: 2022-11-21 | End: 2022-11-21 | Stop reason: SDUPTHER

## 2022-11-21 RX ORDER — SODIUM CHLORIDE 9 MG/ML
INJECTION, SOLUTION INTRAVENOUS CONTINUOUS
Status: DISCONTINUED | OUTPATIENT
Start: 2022-11-21 | End: 2022-11-21 | Stop reason: HOSPADM

## 2022-11-21 RX ORDER — ONDANSETRON 2 MG/ML
4 INJECTION INTRAMUSCULAR; INTRAVENOUS
Status: DISCONTINUED | OUTPATIENT
Start: 2022-11-21 | End: 2022-11-21 | Stop reason: HOSPADM

## 2022-11-21 RX ORDER — ROCURONIUM BROMIDE 10 MG/ML
INJECTION, SOLUTION INTRAVENOUS PRN
Status: DISCONTINUED | OUTPATIENT
Start: 2022-11-21 | End: 2022-11-21 | Stop reason: SDUPTHER

## 2022-11-21 RX ADMIN — FENTANYL CITRATE 100 MCG: 50 INJECTION, SOLUTION INTRAMUSCULAR; INTRAVENOUS at 11:00

## 2022-11-21 RX ADMIN — ROCURONIUM BROMIDE 50 MG: 10 INJECTION INTRAVENOUS at 11:00

## 2022-11-21 RX ADMIN — SODIUM CHLORIDE: 9 INJECTION, SOLUTION INTRAVENOUS at 11:29

## 2022-11-21 RX ADMIN — PHENYLEPHRINE HYDROCHLORIDE 100 MCG: 10 INJECTION INTRAVENOUS at 11:57

## 2022-11-21 RX ADMIN — ONDANSETRON 4 MG: 2 INJECTION INTRAMUSCULAR; INTRAVENOUS at 12:23

## 2022-11-21 RX ADMIN — SODIUM CHLORIDE: 9 INJECTION, SOLUTION INTRAVENOUS at 10:52

## 2022-11-21 RX ADMIN — PHENYLEPHRINE HYDROCHLORIDE 100 MCG: 10 INJECTION INTRAVENOUS at 11:25

## 2022-11-21 RX ADMIN — PHENYLEPHRINE HYDROCHLORIDE 100 MCG: 10 INJECTION INTRAVENOUS at 11:15

## 2022-11-21 RX ADMIN — CEFAZOLIN 2000 MG: 2 INJECTION, POWDER, FOR SOLUTION INTRAMUSCULAR; INTRAVENOUS at 11:11

## 2022-11-21 RX ADMIN — SODIUM CHLORIDE: 9 INJECTION, SOLUTION INTRAVENOUS at 09:55

## 2022-11-21 RX ADMIN — DEXAMETHASONE SODIUM PHOSPHATE 10 MG: 10 INJECTION INTRAMUSCULAR; INTRAVENOUS at 11:12

## 2022-11-21 RX ADMIN — LIDOCAINE HYDROCHLORIDE 100 MG: 20 INJECTION, SOLUTION INTRAVENOUS at 11:00

## 2022-11-21 RX ADMIN — OXYCODONE AND ACETAMINOPHEN 1 TABLET: 5; 325 TABLET ORAL at 14:23

## 2022-11-21 RX ADMIN — Medication 3 MG: at 12:30

## 2022-11-21 RX ADMIN — GLYCOPYRROLATE 0.6 MG: 0.2 INJECTION INTRAMUSCULAR; INTRAVENOUS at 12:30

## 2022-11-21 RX ADMIN — PROPOFOL 150 MG: 10 INJECTION, EMULSION INTRAVENOUS at 11:00

## 2022-11-21 RX ADMIN — PHENYLEPHRINE HYDROCHLORIDE 100 MCG: 10 INJECTION INTRAVENOUS at 11:31

## 2022-11-21 RX ADMIN — MIDAZOLAM 1 MG: 1 INJECTION INTRAMUSCULAR; INTRAVENOUS at 10:52

## 2022-11-21 ASSESSMENT — PAIN DESCRIPTION - LOCATION
LOCATION: BACK
LOCATION: BACK

## 2022-11-21 ASSESSMENT — PAIN SCALES - GENERAL
PAINLEVEL_OUTOF10: 0
PAINLEVEL_OUTOF10: 0
PAINLEVEL_OUTOF10: 4
PAINLEVEL_OUTOF10: 0
PAINLEVEL_OUTOF10: 3

## 2022-11-21 ASSESSMENT — PAIN DESCRIPTION - PAIN TYPE
TYPE: SURGICAL PAIN
TYPE: SURGICAL PAIN

## 2022-11-21 ASSESSMENT — PAIN DESCRIPTION - ORIENTATION
ORIENTATION: LOWER
ORIENTATION: LOWER

## 2022-11-21 ASSESSMENT — PAIN - FUNCTIONAL ASSESSMENT: PAIN_FUNCTIONAL_ASSESSMENT: 0-10

## 2022-11-21 ASSESSMENT — PAIN DESCRIPTION - ONSET: ONSET: ON-GOING

## 2022-11-21 ASSESSMENT — PAIN DESCRIPTION - FREQUENCY
FREQUENCY: INTERMITTENT
FREQUENCY: INTERMITTENT

## 2022-11-21 ASSESSMENT — PAIN DESCRIPTION - DESCRIPTORS
DESCRIPTORS: ACHING;BURNING
DESCRIPTORS: ACHING;BURNING;CRAMPING

## 2022-11-21 ASSESSMENT — LIFESTYLE VARIABLES: SMOKING_STATUS: 0

## 2022-11-21 NOTE — ANESTHESIA PRE PROCEDURE
Department of Anesthesiology  Preprocedure Note       Name:  Viridiana David   Age:  [de-identified] y.o.  :  1940                                          MRN:  [id number]         Date:  2022      Surgeon: Catina Plunkett):  Devon Olivera MD    Procedure: Procedure(s):  L4-L5 LAMINECTOMY AND RIGHT L4-L5 DISCECTOMY, C-ARM    Medications prior to admission:   Prior to Admission medications    Medication Sig Start Date End Date Taking? Authorizing Provider   buPROPion (WELLBUTRIN XL) 150 MG extended release tablet Take 150 mg by mouth every evening   Yes Historical Provider, MD   vitamin C (ASCORBIC ACID) 500 MG tablet Take 1,000 mg by mouth daily   Yes Historical Provider, MD   simvastatin (ZOCOR) 80 MG tablet TAKE 1 TABLET BY MOUTH EVERY DAY 22   Historical Provider, MD   valsartan-hydroCHLOROthiazide (DIOVAN-HCT) 80-12.5 MG per tablet Take 1 tablet by mouth daily 10/10/22   Historical Provider, MD   pantoprazole (PROTONIX) 40 MG tablet Take 40 mg by mouth daily    Historical Provider, MD   CRANBERRY PO Take by mouth    Historical Provider, MD   MELATONIN PO Take by mouth as needed    Historical Provider, MD   Calcium Polycarbophil (FIBERCON PO) Take by mouth daily    Historical Provider, MD   ALPRAZolam (XANAX) 0.25 MG tablet Take 0.25 mg by mouth nightly as needed for Sleep. Historical Provider, MD   Chlorpheniramine Maleate (ALLERGY PO) Take by mouth as needed    Historical Provider, MD   FLUTICASONE FUROATE NA by Nasal route as needed    Historical Provider, MD   BIOTIN PO Take by mouth daily    Historical Provider, MD   sertraline (ZOLOFT) 100 MG tablet Take 50 mg by mouth daily     Historical Provider, MD   amLODIPine (NORVASC) 10 MG tablet Take 5 mg by mouth daily    Historical Provider, MD   Calcium Carb-Cholecalciferol (CALCIUM 600+D3 PO) Take 1 tablet by mouth 2 times daily. Historical Provider, MD   Multiple Vitamins-Minerals (CENTRUM SILVER) TABS Take 1 tablet by mouth daily.       Historical Provider, MD Vitamin D (CHOLECALCIFEROL) 1000 UNITS CAPS capsule Take 2,000 Units by mouth daily    Historical Provider, MD       Current medications:    No current facility-administered medications for this encounter. Current Outpatient Medications   Medication Sig Dispense Refill    buPROPion (WELLBUTRIN XL) 150 MG extended release tablet Take 150 mg by mouth every evening      vitamin C (ASCORBIC ACID) 500 MG tablet Take 1,000 mg by mouth daily      simvastatin (ZOCOR) 80 MG tablet TAKE 1 TABLET BY MOUTH EVERY DAY      valsartan-hydroCHLOROthiazide (DIOVAN-HCT) 80-12.5 MG per tablet Take 1 tablet by mouth daily      pantoprazole (PROTONIX) 40 MG tablet Take 40 mg by mouth daily      CRANBERRY PO Take by mouth      MELATONIN PO Take by mouth as needed      Calcium Polycarbophil (FIBERCON PO) Take by mouth daily      ALPRAZolam (XANAX) 0.25 MG tablet Take 0.25 mg by mouth nightly as needed for Sleep.  Chlorpheniramine Maleate (ALLERGY PO) Take by mouth as needed      FLUTICASONE FUROATE NA by Nasal route as needed      BIOTIN PO Take by mouth daily      sertraline (ZOLOFT) 100 MG tablet Take 50 mg by mouth daily       amLODIPine (NORVASC) 10 MG tablet Take 5 mg by mouth daily      Calcium Carb-Cholecalciferol (CALCIUM 600+D3 PO) Take 1 tablet by mouth 2 times daily.  Multiple Vitamins-Minerals (CENTRUM SILVER) TABS Take 1 tablet by mouth daily.  Vitamin D (CHOLECALCIFEROL) 1000 UNITS CAPS capsule Take 2,000 Units by mouth daily         Allergies:     Allergies   Allergen Reactions    Aspirin Anaphylaxis    Propoxyphene     Darvocet [Propoxyphene N-Acetaminophen] Nausea And Vomiting    Demerol Nausea And Vomiting       Problem List:    Patient Active Problem List   Diagnosis Code    Trigger ring finger of left hand M65.342    Lumbar stenosis M48.061    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       Past Medical History:        Diagnosis Date    Depression     Hiatal hernia  Hyperlipidemia     Hypertension        Past Surgical History:        Procedure Laterality Date    BLADDER REPAIR      x3    COLONOSCOPY      ENDOSCOPY, COLON, DIAGNOSTIC      FRACTURE SURGERY      right ankle    HERNIA REPAIR      HYSTERECTOMY (CERVIX STATUS UNKNOWN)      OTHER SURGICAL HISTORY  11/15/12    tenovaginotomy left ring finger    SKIN BIOPSY      TONSILLECTOMY         Social History:    Social History     Tobacco Use    Smoking status: Never    Smokeless tobacco: Never   Substance Use Topics    Alcohol use: No                                Counseling given: Not Answered      Vital Signs (Current): There were no vitals filed for this visit. BP Readings from Last 3 Encounters:   11/14/22 139/63   10/13/22 129/78   04/03/22 (!) 143/74       NPO Status:  8+hrs                                                                               BMI:   Wt Readings from Last 3 Encounters:   11/14/22 137 lb 3.2 oz (62.2 kg)   10/13/22 135 lb (61.2 kg)   04/03/22 135 lb (61.2 kg)     There is no height or weight on file to calculate BMI.    CBC:   Lab Results   Component Value Date/Time    WBC 7.2 11/14/2022 12:10 PM    RBC 4.83 11/14/2022 12:10 PM    HGB 14.1 11/14/2022 12:10 PM    HCT 43.1 11/14/2022 12:10 PM    MCV 89.2 11/14/2022 12:10 PM    RDW 12.5 11/14/2022 12:10 PM     11/14/2022 12:10 PM       CMP:   Lab Results   Component Value Date/Time     11/14/2022 12:10 PM    K 4.4 11/14/2022 12:10 PM    CL 99 11/14/2022 12:10 PM    CO2 30 11/14/2022 12:10 PM    BUN 15 11/14/2022 12:10 PM    CREATININE 0.8 11/14/2022 12:10 PM    LABGLOM >60 11/14/2022 12:10 PM    GLUCOSE 101 11/14/2022 12:10 PM    CALCIUM 10.4 11/14/2022 12:10 PM       POC Tests: No results for input(s): POCGLU, POCNA, POCK, POCCL, POCBUN, POCHEMO, POCHCT in the last 72 hours.     Coags:   Lab Results   Component Value Date/Time    PROTIME 10.7 11/14/2022 12:10 PM    INR 1.0 11/14/2022 12:10 PM       HCG (If Applicable): No results found for: PREGTESTUR, PREGSERUM, HCG, HCGQUANT     ABGs: No results found for: PHART, PO2ART, UOC5QRD, VYT6BPB, BEART, G8SDTFFH     Type & Screen (If Applicable):  No results found for: LABABO, LABRH    Drug/Infectious Status (If Applicable):  No results found for: HIV, HEPCAB    COVID-19 Screening (If Applicable): No results found for: COVID19        Anesthesia Evaluation  Patient summary reviewed and Nursing notes reviewed no history of anesthetic complications:   Airway:           Dental:          Pulmonary:       (-) not a current smoker          Patient did not smoke on day of surgery. Cardiovascular:    (+) hypertension: moderate, hyperlipidemia         Beta Blocker:  Not on Beta Blocker         Neuro/Psych:   (+) depression/anxiety             GI/Hepatic/Renal:   (+) hiatal hernia,           Endo/Other:                     Abdominal:             Vascular: Other Findings:           Anesthesia Plan      general     ASA 3       Induction: intravenous. MIPS: Postoperative opioids intended and Prophylactic antiemetics administered. Anesthetic plan and risks discussed with patient. Use of blood products discussed with patient whom consented to blood products.                      Solange Bass RN   11/21/2022

## 2022-11-21 NOTE — PROGRESS NOTES
CLINICAL PHARMACY NOTE: MEDS TO BEDS    Total # of Prescriptions Filled: 2   The following medications were delivered to the patient:  Rosellen Hanly 4 MG  PERCOCET 5/325 MG    Additional Documentation:

## 2022-11-21 NOTE — ANESTHESIA POSTPROCEDURE EVALUATION
Department of Anesthesiology  Postprocedure Note    Patient: Cristo Villanueva  MRN: [id number]  [place]: 1940  Date of evaluation: 11/21/2022      Procedure Summary     Date: 11/21/22 Room / Location: [facility] 06 / [facility]    Anesthesia Start: 8243 Anesthesia Stop: 2026    Procedure: L4-L5 LAMINECTOMY AND RIGHT L4-L5 DISCECTOMY (Right) Diagnosis:       Lumbar disc herniation with radiculopathy      Stenosis, spinal, lumbar      (Lumbar disc herniation with radiculopathy [M51.16])      (Stenosis, spinal, lumbar [U19.379])    Surgeons: Boni Benson MD Responsible Provider: Jackelyn Samuel DO    Anesthesia Type: General ASA Status: 3          Anesthesia Type: General    Cris Phase I: Cris Score: 10    Cris Phase II:        Anesthesia Post Evaluation    Patient location during evaluation: PACU  Patient participation: complete - patient participated  Level of consciousness: awake and alert  Airway patency: patent  Nausea & Vomiting: no nausea and no vomiting  Complications: no  Cardiovascular status: blood pressure returned to baseline  Respiratory status: acceptable  Hydration status: euvolemic  Multimodal analgesia pain management approach

## 2022-11-21 NOTE — BRIEF OP NOTE
Brief Postoperative Note      Patient: Elisa Ellis  YOB: 1940  MRN: [id number]    Date of Procedure: 11/21/2022    Pre-Op Diagnosis: Lumbar disc herniation with radiculopathy [M51.16]  Stenosis, spinal, lumbar [M48.061]    Post-Op Diagnosis: Same       Procedure(s):  L4-L5 LAMINECTOMY AND RIGHT L4-L5 DISCECTOMY    Surgeon(s):  Donaldo Ramírez MD    Assistant:  Resident: Mercedes Oliveros DO    Anesthesia: General    Estimated Blood Loss (mL): Minimal    Complications: None    Specimens:   * No specimens in log *    Implants:  * No implants in log *      Drains: * No LDAs found *    Findings: see dictated op note    Electronically signed by Cosme Ashton MD on 11/21/2022 at 12:56 PM

## 2022-11-21 NOTE — DISCHARGE INSTRUCTIONS
Discharge Instructions    1. No lifting more than 10 pounds. 2. Refrain from bending, twisting, or turning at the waist.  3. No brace is needed to be worn. 4. Leave incision open to air. 5. All stitches are under the skin and will dissolve in time. 6. Patient may shower, do not soak or scrub at the incision site. 7. Refrain from driving and sexual activity for 1 month. 8. Follow-up in the office in 1 month, no films necessary. 9. Remove dressing in 48 hrs.

## 2022-11-21 NOTE — ANESTHESIA PRE PROCEDURE
Department of Anesthesiology  Preprocedure Note       Name:  Ousmane Jimenez   Age:  [de-identified] y.o.  :  1940                                          MRN:  [id number]         Date:  2022      Surgeon: Adamaris Marshall):  Hanna Colin MD    Procedure: Procedure(s):  L4-L5 LAMINECTOMY AND RIGHT L4-L5 DISCECTOMY, C-ARM    Medications prior to admission:   Prior to Admission medications    Medication Sig Start Date End Date Taking? Authorizing Provider   buPROPion (WELLBUTRIN XL) 150 MG extended release tablet Take 150 mg by mouth every evening   Yes Historical Provider, MD   vitamin C (ASCORBIC ACID) 500 MG tablet Take 1,000 mg by mouth daily   Yes Historical Provider, MD   simvastatin (ZOCOR) 80 MG tablet TAKE 1 TABLET BY MOUTH EVERY DAY 22   Historical Provider, MD   valsartan-hydroCHLOROthiazide (DIOVAN-HCT) 80-12.5 MG per tablet Take 1 tablet by mouth daily 10/10/22   Historical Provider, MD   pantoprazole (PROTONIX) 40 MG tablet Take 40 mg by mouth daily    Historical Provider, MD   CRANBERRY PO Take by mouth    Historical Provider, MD   MELATONIN PO Take by mouth as needed    Historical Provider, MD   Calcium Polycarbophil (FIBERCON PO) Take by mouth daily    Historical Provider, MD   ALPRAZolam (XANAX) 0.25 MG tablet Take 0.25 mg by mouth nightly as needed for Sleep. Historical Provider, MD   Chlorpheniramine Maleate (ALLERGY PO) Take by mouth as needed    Historical Provider, MD   FLUTICASONE FUROATE NA by Nasal route as needed    Historical Provider, MD   BIOTIN PO Take by mouth daily    Historical Provider, MD   sertraline (ZOLOFT) 100 MG tablet Take 50 mg by mouth daily     Historical Provider, MD   amLODIPine (NORVASC) 10 MG tablet Take 5 mg by mouth daily    Historical Provider, MD   Calcium Carb-Cholecalciferol (CALCIUM 600+D3 PO) Take 1 tablet by mouth 2 times daily. Historical Provider, MD   Multiple Vitamins-Minerals (CENTRUM SILVER) TABS Take 1 tablet by mouth daily.       Historical Provider, MD Vitamin D (CHOLECALCIFEROL) 1000 UNITS CAPS capsule Take 2,000 Units by mouth daily    Historical Provider, MD       Current medications:    Current Facility-Administered Medications   Medication Dose Route Frequency Provider Last Rate Last Admin    sodium chloride flush 0.9 % injection 5-40 mL  5-40 mL IntraVENous 2 times per day Luis Dust, [place]        sodium chloride flush 0.9 % injection 5-40 mL  5-40 mL IntraVENous PRN Luis Dust, [place]        0.9 % sodium chloride infusion   IntraVENous PRN Luis Dust, [place]        0.9 % sodium chloride infusion   IntraVENous Continuous Luis Dust, [place] 125 mL/hr at 11/21/22 0955 New Bag at 11/21/22 0955    ceFAZolin (ANCEF) 2,000 mg in sterile water 20 mL IV syringe  2,000 mg IntraVENous On Call to [address], [place]           Allergies: Allergies   Allergen Reactions    Aspirin Anaphylaxis    Propoxyphene     Darvocet [Propoxyphene N-Acetaminophen] Nausea And Vomiting    Demerol Nausea And Vomiting       Problem List:    Patient Active Problem List   Diagnosis Code    Trigger ring finger of left hand M65.342    Lumbar stenosis M48.061    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       Past Medical History:        Diagnosis Date    Depression     Hiatal hernia     Hyperlipidemia     Hypertension        Past Surgical History:        Procedure Laterality Date    BLADDER REPAIR      x3    COLONOSCOPY      ENDOSCOPY, COLON, DIAGNOSTIC      FRACTURE SURGERY      right ankle    HERNIA REPAIR      HYSTERECTOMY (CERVIX STATUS UNKNOWN)      OTHER SURGICAL HISTORY  11/15/12    tenovaginotomy left ring finger    SKIN BIOPSY      TONSILLECTOMY         Social History:    Social History     Tobacco Use    Smoking status: Never    Smokeless tobacco: Never   Substance Use Topics    Alcohol use:  No                                Counseling given: Not Answered      Vital Signs (Current):   Vitals:    11/21/22 0919   BP: (!) 144/75   Pulse: 90 Resp: 20   Temp: 36.3 °C (97.4 °F)   TempSrc: Temporal   SpO2: 95%   Weight: 137 lb (62.1 kg)   Height: 5' 2\" (1.575 m)                                              BP Readings from Last 3 Encounters:   11/21/22 (!) 144/75   11/14/22 139/63   10/13/22 129/78       NPO Status: Time of last liquid consumption: 2000                        Time of last solid consumption: 2000                        Date of last liquid consumption: 11/20/22                        Date of last solid food consumption: 11/20/22    BMI:   Wt Readings from Last 3 Encounters:   11/21/22 137 lb (62.1 kg)   11/14/22 137 lb 3.2 oz (62.2 kg)   10/13/22 135 lb (61.2 kg)     Body mass index is 25.06 kg/m². CBC:   Lab Results   Component Value Date/Time    WBC 7.2 11/14/2022 12:10 PM    RBC 4.83 11/14/2022 12:10 PM    HGB 14.1 11/14/2022 12:10 PM    HCT 43.1 11/14/2022 12:10 PM    MCV 89.2 11/14/2022 12:10 PM    RDW 12.5 11/14/2022 12:10 PM     11/14/2022 12:10 PM       CMP:   Lab Results   Component Value Date/Time     11/14/2022 12:10 PM    K 4.4 11/14/2022 12:10 PM    CL 99 11/14/2022 12:10 PM    CO2 30 11/14/2022 12:10 PM    BUN 15 11/14/2022 12:10 PM    CREATININE 0.8 11/14/2022 12:10 PM    LABGLOM >60 11/14/2022 12:10 PM    GLUCOSE 101 11/14/2022 12:10 PM    CALCIUM 10.4 11/14/2022 12:10 PM       POC Tests: No results for input(s): POCGLU, POCNA, POCK, POCCL, POCBUN, POCHEMO, POCHCT in the last 72 hours.     Coags:   Lab Results   Component Value Date/Time    PROTIME 10.7 11/14/2022 12:10 PM    INR 1.0 11/14/2022 12:10 PM       HCG (If Applicable): No results found for: PREGTESTUR, PREGSERUM, HCG, HCGQUANT     ABGs: No results found for: PHART, PO2ART, MHW9FXA, HDW1UON, BEART, J8GTCYPO     Type & Screen (If Applicable):  No results found for: LABABO, LABRH    Drug/Infectious Status (If Applicable):  No results found for: HIV, HEPCAB    COVID-19 Screening (If Applicable): No results found for: COVID19    CXR: 11/14/2022  No acute process.       Minimal atelectasis, left lung base.               Anesthesia Evaluation  Patient summary reviewed and Nursing notes reviewed no history of anesthetic complications:   Airway: Mallampati: III  TM distance: <3 FB   Neck ROM: full  Mouth opening: > = 3 FB   Dental:      Comment: Patient states that remaining teeth are intact. Nose ring covered with tape. Patient states she is not able to remove it. Bilateral ear piercings covered with tape. Patient states she is not able to remove. Pulmonary: breath sounds clear to auscultation  (+) sleep apnea: on CPAP,      (-) not a current smoker                           Cardiovascular:  Exercise tolerance: poor (<4 METS),   (+) hypertension:,         Rhythm: regular  Rate: normal           Beta Blocker:  Not on Beta Blocker         Neuro/Psych:   (+) neuromuscular disease:, depression/anxiety              ROS comment: Lumbar stenosis  Lumbar disc herniation with radiculopathy    Frequent falls due to back pain GI/Hepatic/Renal:   (+) hiatal hernia, GERD: well controlled,           Endo/Other:    (+) : arthritis: OA., .                 Abdominal:             Vascular: negative vascular ROS. Other Findings:           Anesthesia Plan      general     ASA 3     (#20 Left wrist )        Anesthetic plan and risks discussed with patient. Use of blood products discussed with patient whom consented to blood products. Plan discussed with attending and CRNA. Jen Cunha RN   11/21/2022    Chart reviewed, findings discussed with anesthesiologist and or Charanjit Butler. Anesthesia plan of care discussed with jorge Smith CRNA

## 2022-11-21 NOTE — H&P
I have examined the patient and reviewed the H and P and no changes are noted.   The left leg is worse    Haider Fernandez MD

## 2022-11-22 NOTE — OP NOTE
[address]                  [address] [facility],  [facility]                                OPERATIVE REPORT    PATIENT NAME: Catie Matos                        :        1940  MED REC NO:   [id number]                            ROOM:  ACCOUNT NO:   [de-identified]                           ADMIT DATE: 2022  PROVIDER:     Parisa Sethi MD    DATE OF PROCEDURE:  2022    PREOPERATIVE DIAGNOSES:  1. L4-5 lumbar canal stenosis. 2.  L4-5 herniated nucleus pulposus. POSTOPERATIVE DIAGNOSES:  1. L4-5 lumbar canal stenosis. 2.  L4-5 herniated nucleus pulposus. OPERATIVE PROCEDURE:  Bilateral L4 and L5 laminectomy, bilateral L4-L5  medial facetectomy, bilateral L4 and L5 foraminotomy and bilateral L4-L5  diskectomy. ANESTHESIA:  Generalized endotracheal anesthesia. SURGEON:  Parisa Sethi MD    ASSISTANT:  Kim Manning DO    COMPLICATIONS  None. ESTIMATED BLOOD LOSS:  Minimal.    SPECIMEN:  Disk. OPERATIVE INDICATIONS:  The patient is an 82-year-old lady who presents  to the office complaining of back pain that radiated into her legs. She  had an MRI that showed that she had herniated disk at L4-L5 with lumbar  canal stenosis. She had failed conservative therapy and after risks,  benefits and alternatives were discussed with the patient, it was  determined that she would undergo the above-listed procedure. DESCRIPTION OF OPERATIVE PROCEDURE:  The patient was brought into the  operating room. A timeout was performed where she was identified by her  name, medical record number and the operative procedure which she was  about to undergo. Next, induction of generalized endotracheal  anesthesia was then commenced. Upon completion of induction of  generalized endotracheal anesthesia, she received preoperative  antibiotics. Lumbosacral region was prepped and draped in usual sterile  fashion.   After this was done, #10 blade was used to make a skin  incision. Monopolar cautery was used to dissect through subcutaneous  tissue. I placed self-retaining Weitlaner retractor into the wound. Next, I opened up the lumbodorsal fascia sharply with monopolar cautery  and exposed the spinous process at L4 and L5. I used intraoperative  fluoroscopy to confirm I was at the appropriate level. I then proceeded  to then perform a subperiosteal dissection to expose the bilateral  lamina at L4 and L5. After this was done, I placed self-retaining  angled cerebellar retractors into the wound. I used a Leksell rongeur  to bite up the spinous process at L4 and L5. I used high-speed bur to  thin out the lamina at L4 and L5. I then used #4 Kerrison punch to  start my central decompression. I performed bilateral L4 and L5  laminectomy. Once the laminectomies were completed, I then focused my  attention to lateral recesses. I used #3 Kerrison punch to perform  bilateral L4-L5 medial facetectomy and bilateral L4 and L5 foraminotomy. After this was done, I identified the L4-L5 disk space on the left,  retracted thecal sac medially, performed an annulotomy with an #11  blade. I found a small free fragment of disk that was removed with  pituitary rongeur. I then went on to view the patient's right side,  identified the L4-L5 disk space, performed an annulotomy with an  #11-blade and again found a small free fragment of disk. This was  removed with pituitary rongeur. After this was done, I inspected the  wound for any evidence of bleeding. Adequate hemostasis was obtained  with both monopolar and bipolar cautery. I irrigated the wound  copiously with antibiotic impregnated saline. After obtaining adequate  hemostasis, I then closed the wound in layers using 0 Vicryl for the  fascia, 2-0 Vicryl for the subcutaneous layer and 4-0 Monocryl in  subcuticular fashion for the skin. Dermabond was applied over the skin  incision.   A dry sterile dressing was placed over this. The patient was  then flipped in supine position on her hospital bed, was extubated and  transported to the postanesthesia care unit in stable condition. There  were no complications. Counts were correct. I was present for the  entire case.         Bertrand Jamil MD    D: 11/21/2022 13:54:42       T: 11/21/2022 13:56:57     [id number]  Job#: [id number]     Doc#: [id number]    CC:

## 2022-12-20 ENCOUNTER — OFFICE VISIT (OUTPATIENT)
Dept: NEUROSURGERY | Age: 82
End: 2022-12-20
Payer: MEDICARE

## 2022-12-20 VITALS
RESPIRATION RATE: 14 BRPM | HEIGHT: 67 IN | SYSTOLIC BLOOD PRESSURE: 134 MMHG | HEART RATE: 80 BPM | DIASTOLIC BLOOD PRESSURE: 78 MMHG | WEIGHT: 132 LBS | TEMPERATURE: 97.3 F | BODY MASS INDEX: 20.72 KG/M2 | OXYGEN SATURATION: 93 %

## 2022-12-20 DIAGNOSIS — Z98.890 S/P LAMINECTOMY: Primary | ICD-10-CM

## 2022-12-20 PROCEDURE — 99212 OFFICE O/P EST SF 10 MIN: CPT

## 2022-12-20 PROCEDURE — 99024 POSTOP FOLLOW-UP VISIT: CPT | Performed by: STUDENT IN AN ORGANIZED HEALTH CARE EDUCATION/TRAINING PROGRAM

## 2022-12-20 NOTE — PROGRESS NOTES
Post-Operative Follow-up     This is a [de-identified]year old female who presents to the office for a 1 month follow-up s/p L4-L5 laminectomy      Subjective: Patient states she is doing well. Does admits to some tingling still in her left calf, but no pain radiating down her legs. No new numbness or weakness. No other complaints. Physical Exam:              WDWN, no apparent distress              Non-labored breathing               Vitals Stable              Alert and oriented x3              CN 3-12 intact              PERRL              EOMI              CHAU well              Motor strength symmetric              Sensation to LT intact bilaterally   Incision healing well without signs of infection. Assessment: This is a [de-identified] y.o.  female presenting for a 1 month follow-up s/p L4-L5 laminectomy. Stable. Plan:  -Pain control and expectations discussed  -Continue brace and restrictions x 2 more months  -OARRS report reviewed   -Follow-up in neurosurgery clinic in 2 months for 3 month follow up.   -Call or return to neurosurgery office sooner if symptoms worsen or if new issues arise in the interim.     Electronically signed by Savannah Shea PA-C on 12/20/2022 at 3:27 PM

## 2022-12-29 ENCOUNTER — HOSPITAL ENCOUNTER (EMERGENCY)
Age: 82
Discharge: HOME | End: 2022-12-29
Payer: MEDICARE

## 2022-12-29 ENCOUNTER — APPOINTMENT (OUTPATIENT)
Dept: GENERAL RADIOLOGY | Age: 82
End: 2022-12-29
Payer: MEDICARE

## 2022-12-29 VITALS
OXYGEN SATURATION: 97 % | RESPIRATION RATE: 16 BRPM | SYSTOLIC BLOOD PRESSURE: 137 MMHG | TEMPERATURE: 98.1 F | WEIGHT: 135 LBS | DIASTOLIC BLOOD PRESSURE: 85 MMHG | BODY MASS INDEX: 21.14 KG/M2 | HEART RATE: 101 BPM

## 2022-12-29 DIAGNOSIS — S39.012A LUMBAR STRAIN, INITIAL ENCOUNTER: ICD-10-CM

## 2022-12-29 DIAGNOSIS — J32.9 SINOBRONCHITIS: Primary | ICD-10-CM

## 2022-12-29 DIAGNOSIS — J40 SINOBRONCHITIS: Primary | ICD-10-CM

## 2022-12-29 PROCEDURE — 72100 X-RAY EXAM L-S SPINE 2/3 VWS: CPT

## 2022-12-29 PROCEDURE — 99211 OFF/OP EST MAY X REQ PHY/QHP: CPT

## 2022-12-29 RX ORDER — DOXYCYCLINE HYCLATE 100 MG
100 TABLET ORAL 2 TIMES DAILY
Qty: 14 TABLET | Refills: 0 | Status: SHIPPED | OUTPATIENT
Start: 2022-12-29 | End: 2023-01-05

## 2022-12-29 RX ORDER — BROMPHENIRAMINE MALEATE, PSEUDOEPHEDRINE HYDROCHLORIDE, AND DEXTROMETHORPHAN HYDROBROMIDE 2; 30; 10 MG/5ML; MG/5ML; MG/5ML
5 SYRUP ORAL 3 TIMES DAILY PRN
Qty: 120 ML | Refills: 0 | Status: SHIPPED | OUTPATIENT
Start: 2022-12-29
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
## 2023-01-11 ENCOUNTER — TELEPHONE (OUTPATIENT)
Dept: NEUROSURGERY | Age: 83
End: 2023-01-11

## 2023-01-11 RX ORDER — METHYLPREDNISOLONE 4 MG/1
TABLET ORAL
Qty: 1 KIT | Refills: 0 | Status: SHIPPED | OUTPATIENT
Start: 2023-01-11 | End: 2023-01-17

## 2023-01-11 NOTE — TELEPHONE ENCOUNTER
Patient had surgery back in November and she called today saying her left leg hurts and she can hardly walk. Not sure if she needs an appt with us or can you order anything?       [phone]

## 2023-01-16 ENCOUNTER — TELEPHONE (OUTPATIENT)
Dept: NEUROSURGERY | Age: 83
End: 2023-01-16

## 2023-01-16 DIAGNOSIS — Z98.890 S/P LAMINECTOMY: ICD-10-CM

## 2023-01-16 DIAGNOSIS — M54.42 ACUTE MIDLINE LOW BACK PAIN WITH BILATERAL SCIATICA: Primary | ICD-10-CM

## 2023-01-16 DIAGNOSIS — M54.41 ACUTE MIDLINE LOW BACK PAIN WITH BILATERAL SCIATICA: Primary | ICD-10-CM

## 2023-01-16 NOTE — TELEPHONE ENCOUNTER
Patient states she finished the medrol dose pack and it only helped the first couple of days and now is having the same pain in her left leg. She states [facility] said he was going to order an MRI if the pain continued.

## 2023-01-23 ENCOUNTER — HOSPITAL ENCOUNTER (OUTPATIENT)
Age: 83
Discharge: HOME | End: 2023-01-23
Payer: MEDICARE

## 2023-01-23 ENCOUNTER — HOSPITAL ENCOUNTER (OUTPATIENT)
Dept: MRI IMAGING | Age: 83
Discharge: HOME | End: 2023-01-25
Payer: MEDICARE

## 2023-01-23 DIAGNOSIS — M54.42 ACUTE MIDLINE LOW BACK PAIN WITH BILATERAL SCIATICA: ICD-10-CM

## 2023-01-23 DIAGNOSIS — Z98.890 S/P LAMINECTOMY: ICD-10-CM

## 2023-01-23 DIAGNOSIS — M54.41 ACUTE MIDLINE LOW BACK PAIN WITH BILATERAL SCIATICA: ICD-10-CM

## 2023-01-23 LAB
BUN BLDV-MCNC: 14 MG/DL (ref 6–23)
CREAT SERPL-MCNC: 0.8 MG/DL (ref 0.5–1)
GFR SERPL CREATININE-BSD FRML MDRD: >60 ML/MIN/1.73

## 2023-01-23 PROCEDURE — 72158 MRI LUMBAR SPINE W/O & W/DYE: CPT

## 2023-01-23 PROCEDURE — 6360000004 HC RX CONTRAST MEDICATION: Performed by: RADIOLOGY

## 2023-01-23 PROCEDURE — 82565 ASSAY OF CREATININE: CPT

## 2023-01-23 PROCEDURE — 84520 ASSAY OF UREA NITROGEN: CPT

## 2023-01-23 PROCEDURE — 36415 COLL VENOUS BLD VENIPUNCTURE: CPT

## 2023-01-23 PROCEDURE — A9577 INJ MULTIHANCE: HCPCS | Performed by: RADIOLOGY

## 2023-01-23 RX ADMIN — GADOBENATE DIMEGLUMINE 13 ML: 529 INJECTION, SOLUTION INTRAVENOUS at 11:17

## 2023-01-24 ENCOUNTER — TELEPHONE (OUTPATIENT)
Dept: NEUROSURGERY | Age: 83
End: 2023-01-24

## 2023-01-25 ENCOUNTER — TELEPHONE (OUTPATIENT)
Dept: NEUROSURGERY | Age: 83
End: 2023-01-25

## 2023-01-26 ENCOUNTER — OFFICE VISIT (OUTPATIENT)
Dept: NEUROSURGERY | Age: 83
End: 2023-01-26
Payer: MEDICARE

## 2023-01-26 VITALS
OXYGEN SATURATION: 97 % | BODY MASS INDEX: 24.84 KG/M2 | SYSTOLIC BLOOD PRESSURE: 119 MMHG | HEIGHT: 62 IN | HEART RATE: 78 BPM | WEIGHT: 135 LBS | TEMPERATURE: 97 F | DIASTOLIC BLOOD PRESSURE: 70 MMHG

## 2023-01-26 DIAGNOSIS — Z98.890 S/P LAMINECTOMY: Primary | ICD-10-CM

## 2023-01-26 DIAGNOSIS — M54.50 ACUTE BILATERAL LOW BACK PAIN WITHOUT SCIATICA: ICD-10-CM
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
## 2023-01-26 PROCEDURE — 99212 OFFICE O/P EST SF 10 MIN: CPT

## 2023-01-26 PROCEDURE — 99024 POSTOP FOLLOW-UP VISIT: CPT | Performed by: STUDENT IN AN ORGANIZED HEALTH CARE EDUCATION/TRAINING PROGRAM

## 2023-01-26 NOTE — PROGRESS NOTES
Problem Focused Office Visit     Subjective: Aiden Tapia is a [de-identified] y.o.  female who has a past medical history of L4-L5 laminectomy on 2022 who presents to review MRI results. Patient with increased low back pain and pain radiating down her left leg after falling out of her bed. Medrol dose jl was prescribed and patient states it only provided minimal relief. Patient presents today with some improvement in her pain. She still admits to intermittent pain down her left leg. She denies any associated numbness or weakness. MRI reviewed with patient. Denies loss of bowel or bladder, saddle anesthesia, headache, loss of dexterity, abnormal gait, fever, chills, N/V, SOB, or chest pain. Physical Exam  HENT:      Head: Normocephalic. Eyes:      Pupils: Pupils are equal, round, and reactive to light. Cardiovascular:      Rate and Rhythm: Normal rate. Pulmonary:      Effort: Pulmonary effort is normal.   Abdominal:      General: There is no distension. Musculoskeletal:         General: Normal range of motion. Cervical back: Normal range of motion. Skin:     General: Skin is warm and dry. Neurological:      Mental Status: She is alert. Comments: A&Ox3  CN3-12 intact  Motor Strength full   Sensation intact to light touch    Psychiatric:         Thought Content: Thought content normal.          Imagin2023 MRI Lumbar Spine  Impression   Degenerative changes and scoliosis result in moderate spinal stenosis and   mild mass effect on the cauda equina at L2-3, and milder spinal stenosis at   other levels as described above. Moderate left neural foraminal stenosis at L3-4 secondary to degenerative   changes and scoliosis. Milder neural foraminal stenosis at other levels as   described above. Roughly 2 cm x 6 cm x 4 cm rim enhancing fluid collection in the laminectomy   defect at L4-5 is suggestive of seroma, however abscess is not excluded. No   epidural abscess. Assessment: This is a [de-identified] y.o.  female presenting for a office follow-up/review MRI results. Patient with some improvement in low back pain. Still with some intermittent pain down her left leg. MRI as above. Plan:  -Pain control and expectations discussed  -MRI reviewed and discussed in detail  -Continue restrictions   -Follow-up in neurosurgery clinic as scheduled   -Call or return to neurosurgery office sooner if symptoms worsen or if new issues arise in the interim.     Electronically signed by Casey Bhandari PA-C on 1/26/2023 at 6:55 PM

## 2023-02-14 ENCOUNTER — OFFICE VISIT (OUTPATIENT)
Dept: NEUROSURGERY | Age: 83
End: 2023-02-14
Payer: MEDICARE

## 2023-02-14 VITALS
WEIGHT: 134 LBS | DIASTOLIC BLOOD PRESSURE: 81 MMHG | HEART RATE: 94 BPM | BODY MASS INDEX: 24.66 KG/M2 | HEIGHT: 62 IN | SYSTOLIC BLOOD PRESSURE: 137 MMHG

## 2023-02-14 DIAGNOSIS — Z98.890 S/P LAMINECTOMY: Primary | ICD-10-CM

## 2023-02-14 PROCEDURE — 99212 OFFICE O/P EST SF 10 MIN: CPT

## 2023-02-14 PROCEDURE — 99024 POSTOP FOLLOW-UP VISIT: CPT | Performed by: STUDENT IN AN ORGANIZED HEALTH CARE EDUCATION/TRAINING PROGRAM

## 2023-02-14 RX ORDER — AMLODIPINE BESYLATE 5 MG/1
TABLET ORAL
COMMUNITY
Start: 2023-02-04

## 2023-02-14 NOTE — PROGRESS NOTES
Post-Operative Follow-up     This is a [de-identified]year old female who presents to the office for a 3 month follow-up s/p L4-L5 laminectomy      Subjective: Patient states she is doing okay. Does still admit to some pain that radiates down her left leg and into her groin. She describes this pain as a sharp pain. Denies any new numbness or weakness. No bowel or bladder incontinence. Physical Exam:              WDWN, no apparent distress              Non-labored breathing               Vitals Stable              Alert and oriented x3              CN 3-12 intact              PERRL              EOMI              CHAU well              Motor strength symmetric              Sensation to LT intact bilaterally   Incision healed well without signs of infection. Assessment: This is a [de-identified] y.o.  female presenting for a 3 month follow-up s/p L4-L5 laminectomy. Stable. Plan:  -Pain control and expectations discussed  -Can discontinue restrictions   -OARRS report reviewed   -PT referral given  -Follow-up in neurosurgery clinic in 9 months for 1 year follow up.   -Call or return to neurosurgery office sooner if symptoms worsen or if new issues arise in the interim.     Electronically signed by Edgard Jain PA-C on 2/14/2023 at 4:16 PM

## 2023-02-15 ENCOUNTER — TELEPHONE (OUTPATIENT)
Dept: NEUROSURGERY | Age: 83
End: 2023-02-15

## 2023-03-07 ENCOUNTER — TELEPHONE (OUTPATIENT)
Dept: NEUROSURGERY | Age: 83
End: 2023-03-07

## 2023-03-07 RX ORDER — GABAPENTIN 300 MG/1
300 CAPSULE ORAL 3 TIMES DAILY
Qty: 90 CAPSULE | Refills: 3 | Status: SHIPPED | OUTPATIENT
Start: 2023-03-07 | End: 2023-04-06

## 2023-03-07 NOTE — TELEPHONE ENCOUNTER
Patient had surgery last November. She says she is having issues with her legs. She has a weird sensation that feels like something is crawling iinside her legs. She wants to kow if this is normal.  [phone] phone

## 2023-05-10 ENCOUNTER — OFFICE VISIT (OUTPATIENT)
Dept: NEUROSURGERY | Age: 83
End: 2023-05-10
Payer: MEDICARE

## 2023-05-10 VITALS
SYSTOLIC BLOOD PRESSURE: 136 MMHG | BODY MASS INDEX: 24.66 KG/M2 | TEMPERATURE: 97.3 F | HEIGHT: 62 IN | WEIGHT: 134 LBS | DIASTOLIC BLOOD PRESSURE: 75 MMHG | HEART RATE: 85 BPM | RESPIRATION RATE: 18 BRPM | OXYGEN SATURATION: 94 %

## 2023-05-10 DIAGNOSIS — M54.16 LUMBAR RADICULOPATHY: ICD-10-CM

## 2023-05-10 DIAGNOSIS — Z98.890 S/P LAMINECTOMY: ICD-10-CM

## 2023-05-10 DIAGNOSIS — G89.29 CHRONIC LEFT-SIDED LOW BACK PAIN WITH LEFT-SIDED SCIATICA: Primary | ICD-10-CM

## 2023-05-10 DIAGNOSIS — M54.42 CHRONIC LEFT-SIDED LOW BACK PAIN WITH LEFT-SIDED SCIATICA: Primary | ICD-10-CM

## 2023-05-10 PROCEDURE — 99212 OFFICE O/P EST SF 10 MIN: CPT

## 2023-05-10 PROCEDURE — 1090F PRES/ABSN URINE INCON ASSESS: CPT | Performed by: STUDENT IN AN ORGANIZED HEALTH CARE EDUCATION/TRAINING PROGRAM

## 2023-05-10 PROCEDURE — 1123F ACP DISCUSS/DSCN MKR DOCD: CPT | Performed by: STUDENT IN AN ORGANIZED HEALTH CARE EDUCATION/TRAINING PROGRAM

## 2023-05-10 PROCEDURE — 1036F TOBACCO NON-USER: CPT | Performed by: STUDENT IN AN ORGANIZED HEALTH CARE EDUCATION/TRAINING PROGRAM

## 2023-05-10 PROCEDURE — G8427 DOCREV CUR MEDS BY ELIG CLIN: HCPCS | Performed by: STUDENT IN AN ORGANIZED HEALTH CARE EDUCATION/TRAINING PROGRAM

## 2023-05-10 PROCEDURE — 99213 OFFICE O/P EST LOW 20 MIN: CPT | Performed by: STUDENT IN AN ORGANIZED HEALTH CARE EDUCATION/TRAINING PROGRAM

## 2023-05-10 PROCEDURE — G8420 CALC BMI NORM PARAMETERS: HCPCS | Performed by: STUDENT IN AN ORGANIZED HEALTH CARE EDUCATION/TRAINING PROGRAM

## 2023-05-10 PROCEDURE — G8400 PT W/DXA NO RESULTS DOC: HCPCS | Performed by: STUDENT IN AN ORGANIZED HEALTH CARE EDUCATION/TRAINING PROGRAM

## 2023-05-10 RX ORDER — ACETAMINOPHEN 500 MG
1000 TABLET ORAL AS NEEDED
COMMUNITY

## 2023-05-10 NOTE — PROGRESS NOTES
Problem Focused Office Visit     Subjective: Mazin Ly is a [de-identified] y.o.  female who has a previously underwent a L4-L5 laminectomy by Dr. Rosetta Elise on 2022. Patient presents today for worsening pain. She describes this pain as a burning pain that radiates into her low back down into her left leg. She denies any numbness or weakness. She has tried gabapentin and tylenol with no relief. She has also tried Tens unit with some relief. No recent CHUCK. She is a nonsmoker and denies any blood thinner medications. MRI reviewed with patient. Physical Exam  HENT:      Head: Normocephalic. Eyes:      Pupils: Pupils are equal, round, and reactive to light. Cardiovascular:      Rate and Rhythm: Normal rate. Pulmonary:      Effort: Pulmonary effort is normal.   Abdominal:      General: There is no distension. Musculoskeletal:         General: Normal range of motion. Cervical back: Normal range of motion. Skin:     General: Skin is warm and dry. Neurological:      Mental Status: She is alert. Comments: A&Ox3  CN3-12 intact  Motor Strength full   Sensation intact to light touch   (-)Bilateral Straight Leg Test  Non tender to palpation of lumbar spine    Psychiatric:         Thought Content: Thought content normal.                Imagin2023 MRI Lumbar Spine   Degenerative changes and scoliosis result in moderate spinal stenosis and  mild mass effect on the cauda equina at L2-3, and milder spinal stenosis at  other levels as described above. Moderate left neural foraminal stenosis at L3-4 secondary to degenerative  changes and scoliosis. Milder neural foraminal stenosis at other levels as  described above  Roughly 2 cm x 6 cm x 4 cm rim enhancing fluid collection in the laminectomy  defect at L4-5 is suggestive of seroma, however abscess is not excluded. No  epidural abscess. Assessment:  This is a [de-identified] y.o.  female presenting for a office follow-up for worsening low back pain that radiates

## 2023-06-08 ENCOUNTER — OFFICE VISIT (OUTPATIENT)
Dept: PAIN MANAGEMENT | Age: 83
End: 2023-06-08
Payer: MEDICARE

## 2023-06-08 VITALS
WEIGHT: 134 LBS | RESPIRATION RATE: 18 BRPM | TEMPERATURE: 97.1 F | SYSTOLIC BLOOD PRESSURE: 122 MMHG | DIASTOLIC BLOOD PRESSURE: 70 MMHG | HEART RATE: 86 BPM | HEIGHT: 62 IN | OXYGEN SATURATION: 94 % | BODY MASS INDEX: 24.66 KG/M2

## 2023-06-08 DIAGNOSIS — M48.061 SPINAL STENOSIS OF LUMBAR REGION WITHOUT NEUROGENIC CLAUDICATION: ICD-10-CM

## 2023-06-08 DIAGNOSIS — G89.4 CHRONIC PAIN SYNDROME: Primary | ICD-10-CM

## 2023-06-08 DIAGNOSIS — M47.816 LUMBAR FACET ARTHROPATHY: ICD-10-CM

## 2023-06-08 DIAGNOSIS — M54.16 LUMBAR RADICULOPATHY: ICD-10-CM

## 2023-06-08 DIAGNOSIS — M47.816 LUMBAR SPONDYLOSIS: ICD-10-CM

## 2023-06-08 DIAGNOSIS — M51.9 LUMBAR DISC DISORDER: ICD-10-CM

## 2023-06-08 PROCEDURE — 1036F TOBACCO NON-USER: CPT | Performed by: PAIN MEDICINE

## 2023-06-08 PROCEDURE — G8420 CALC BMI NORM PARAMETERS: HCPCS | Performed by: PAIN MEDICINE

## 2023-06-08 PROCEDURE — G8400 PT W/DXA NO RESULTS DOC: HCPCS | Performed by: PAIN MEDICINE

## 2023-06-08 PROCEDURE — 99204 OFFICE O/P NEW MOD 45 MIN: CPT | Performed by: PAIN MEDICINE

## 2023-06-08 PROCEDURE — 1090F PRES/ABSN URINE INCON ASSESS: CPT | Performed by: PAIN MEDICINE

## 2023-06-08 PROCEDURE — G8427 DOCREV CUR MEDS BY ELIG CLIN: HCPCS | Performed by: PAIN MEDICINE

## 2023-06-08 PROCEDURE — 1123F ACP DISCUSS/DSCN MKR DOCD: CPT | Performed by: PAIN MEDICINE

## 2023-06-08 RX ORDER — SODIUM CHLORIDE 0.9 % (FLUSH) 0.9 %
5-40 SYRINGE (ML) INJECTION PRN
OUTPATIENT
Start: 2023-06-08

## 2023-06-08 RX ORDER — SODIUM CHLORIDE 9 MG/ML
INJECTION, SOLUTION INTRAVENOUS PRN
OUTPATIENT
Start: 2023-06-08

## 2023-06-08 RX ORDER — SODIUM CHLORIDE 0.9 % (FLUSH) 0.9 %
5-40 SYRINGE (ML) INJECTION EVERY 12 HOURS SCHEDULED
OUTPATIENT
Start: 2023-06-08

## 2023-06-08 NOTE — PROGRESS NOTES
Sagrario Patel presents to the [facility] on 6/8/2023. Lalo Rodriguez is complaining of pain in her lower back that radiates to left lower extremity and into the groin. The pain is constant. The pain is described as aching and burning. Pain is rated on her best day at a 8, on her worst day at a 10, and on average at a 9 on the VAS scale. She took her last dose of Tylenol yesterday. Lalo Rodriguez does not have issues with constipation. Any procedures since your last visit: No,       She is not on NSAIDS and  is not on anticoagulation medications to include none and is managed by NA. Pacemaker or defibrillator: No Physician managing device is NA. Medication Contract and Consent for Opioid Use Documents Filed        No documents found                       There were no vitals taken for this visit. No LMP recorded. Patient has had a hysterectomy.
screen. OARRS report reviewed 06/2023. Patient encouraged to stay active and to lose weight. Treatment plan discussed with the patient including medications and procedure side effects. We discussed with the patient that combining opioids, benzodiazepines, alcohol, illicit drugs or sleep aids increases the risk of respiratory depression including death. We discussed that these medications may cause drowsiness, sedation or dizziness and have counseled the patient not to drive or operate machinery. We have discussed that these medications will be prescribed only by one provider. We have discussed with the patient about age related risk factors and have thoroughly discussed the importance of taking these medications as prescribed. The patient verbalizes understanding. kiesha Salas M.D.

## 2023-06-20 ENCOUNTER — TELEPHONE (OUTPATIENT)
Dept: PAIN MANAGEMENT | Age: 83
End: 2023-06-20

## 2023-06-20 NOTE — TELEPHONE ENCOUNTER
Call to Garrett Baez that procedure was approved for 6/29/2023 and that [facility] should call her a few days before for the pre op call and after 3:00 PM the business day before with the arrival time. Instructed Ish Sherman to hold ibuprofen for 24 hours, naprosyn for 4 days and any aspirin containing products or fish oil for 7 days. Instructed to call office back if any questions. Ish Sherman verbalized understanding.     Electronically signed by Miguel Rodriguez RN on 6/20/2023 at 2:05 PM

## 2023-06-29 ENCOUNTER — PREP FOR PROCEDURE (OUTPATIENT)
Dept: PAIN MANAGEMENT | Age: 83
End: 2023-06-29

## 2023-07-03 ENCOUNTER — ANESTHESIA EVENT (OUTPATIENT)
Dept: OPERATING ROOM | Age: 83
End: 2023-07-03
Payer: MEDICARE

## 2023-07-03 ASSESSMENT — LIFESTYLE VARIABLES: SMOKING_STATUS: 0

## 2023-07-10 ENCOUNTER — ANESTHESIA (OUTPATIENT)
Dept: OPERATING ROOM | Age: 83
End: 2023-07-10
Payer: MEDICARE

## 2023-07-10 ENCOUNTER — HOSPITAL ENCOUNTER (OUTPATIENT)
Dept: OPERATING ROOM | Age: 83
Setting detail: OUTPATIENT SURGERY
Discharge: HOME | End: 2023-07-10
Attending: PAIN MEDICINE
Payer: MEDICARE

## 2023-07-10 ENCOUNTER — HOSPITAL ENCOUNTER (OUTPATIENT)
Age: 83
Setting detail: OUTPATIENT SURGERY
Discharge: HOME | End: 2023-07-10
Attending: PAIN MEDICINE | Admitting: PAIN MEDICINE
Payer: MEDICARE

## 2023-07-10 VITALS
OXYGEN SATURATION: 96 % | HEART RATE: 75 BPM | DIASTOLIC BLOOD PRESSURE: 59 MMHG | HEIGHT: 63 IN | TEMPERATURE: 96.8 F | RESPIRATION RATE: 16 BRPM | WEIGHT: 136.4 LBS | SYSTOLIC BLOOD PRESSURE: 117 MMHG | BODY MASS INDEX: 24.17 KG/M2

## 2023-07-10 DIAGNOSIS — M51.9 LUMBAR DISC DISEASE: ICD-10-CM

## 2023-07-10 DIAGNOSIS — M54.16 LUMBAR RADICULOPATHY: ICD-10-CM

## 2023-07-10 PROCEDURE — 3600000015 HC SURGERY LEVEL 5 ADDTL 15MIN: Performed by: PAIN MEDICINE

## 2023-07-10 PROCEDURE — 3700000001 HC ADD 15 MINUTES (ANESTHESIA): Performed by: PAIN MEDICINE

## 2023-07-10 PROCEDURE — 3700000000 HC ANESTHESIA ATTENDED CARE: Performed by: PAIN MEDICINE

## 2023-07-10 PROCEDURE — 6360000004 HC RX CONTRAST MEDICATION: Performed by: PAIN MEDICINE

## 2023-07-10 PROCEDURE — 2500000003 HC RX 250 WO HCPCS: Performed by: PAIN MEDICINE

## 2023-07-10 PROCEDURE — 6360000002 HC RX W HCPCS: Performed by: NURSE ANESTHETIST, CERTIFIED REGISTERED

## 2023-07-10 PROCEDURE — 2580000003 HC RX 258: Performed by: ANESTHESIOLOGY

## 2023-07-10 PROCEDURE — 2709999900 HC NON-CHARGEABLE SUPPLY: Performed by: PAIN MEDICINE

## 2023-07-10 PROCEDURE — 7100000010 HC PHASE II RECOVERY - FIRST 15 MIN: Performed by: PAIN MEDICINE

## 2023-07-10 PROCEDURE — 62323 NJX INTERLAMINAR LMBR/SAC: CPT | Performed by: PAIN MEDICINE

## 2023-07-10 PROCEDURE — 7100000011 HC PHASE II RECOVERY - ADDTL 15 MIN: Performed by: PAIN MEDICINE

## 2023-07-10 PROCEDURE — 6360000002 HC RX W HCPCS: Performed by: PAIN MEDICINE

## 2023-07-10 PROCEDURE — 3600000005 HC SURGERY LEVEL 5 BASE: Performed by: PAIN MEDICINE

## 2023-07-10 RX ORDER — SODIUM CHLORIDE 9 MG/ML
INJECTION, SOLUTION INTRAVENOUS PRN
Status: DISCONTINUED | OUTPATIENT
Start: 2023-07-10 | End: 2023-07-10 | Stop reason: HOSPADM

## 2023-07-10 RX ORDER — SODIUM CHLORIDE 0.9 % (FLUSH) 0.9 %
5-40 SYRINGE (ML) INJECTION EVERY 12 HOURS SCHEDULED
Status: DISCONTINUED | OUTPATIENT
Start: 2023-07-10 | End: 2023-07-10 | Stop reason: HOSPADM

## 2023-07-10 RX ORDER — SODIUM CHLORIDE 9 MG/ML
INJECTION, SOLUTION INTRAVENOUS PRN
Status: CANCELLED | OUTPATIENT
Start: 2023-07-10

## 2023-07-10 RX ORDER — FENTANYL CITRATE 50 UG/ML
INJECTION, SOLUTION INTRAMUSCULAR; INTRAVENOUS PRN
Status: DISCONTINUED | OUTPATIENT
Start: 2023-07-10 | End: 2023-07-10 | Stop reason: SDUPTHER

## 2023-07-10 RX ORDER — SODIUM CHLORIDE 0.9 % (FLUSH) 0.9 %
5-40 SYRINGE (ML) INJECTION PRN
Status: CANCELLED | OUTPATIENT
Start: 2023-07-10

## 2023-07-10 RX ORDER — DIPHENHYDRAMINE HYDROCHLORIDE 50 MG/ML
12.5 INJECTION INTRAMUSCULAR; INTRAVENOUS
Status: DISCONTINUED | OUTPATIENT
Start: 2023-07-10 | End: 2023-07-10 | Stop reason: HOSPADM

## 2023-07-10 RX ORDER — SODIUM CHLORIDE 0.9 % (FLUSH) 0.9 %
5-40 SYRINGE (ML) INJECTION PRN
Status: DISCONTINUED | OUTPATIENT
Start: 2023-07-10 | End: 2023-07-10 | Stop reason: HOSPADM

## 2023-07-10 RX ORDER — LIDOCAINE HYDROCHLORIDE 5 MG/ML
INJECTION, SOLUTION INFILTRATION; INTRAVENOUS PRN
Status: DISCONTINUED | OUTPATIENT
Start: 2023-07-10 | End: 2023-07-10 | Stop reason: ALTCHOICE

## 2023-07-10 RX ORDER — DROPERIDOL 2.5 MG/ML
0.62 INJECTION, SOLUTION INTRAMUSCULAR; INTRAVENOUS
Status: DISCONTINUED | OUTPATIENT
Start: 2023-07-10 | End: 2023-07-10 | Stop reason: HOSPADM

## 2023-07-10 RX ORDER — ONDANSETRON 2 MG/ML
INJECTION INTRAMUSCULAR; INTRAVENOUS PRN
Status: DISCONTINUED | OUTPATIENT
Start: 2023-07-10 | End: 2023-07-10 | Stop reason: SDUPTHER

## 2023-07-10 RX ORDER — SODIUM CHLORIDE 0.9 % (FLUSH) 0.9 %
5-40 SYRINGE (ML) INJECTION EVERY 12 HOURS SCHEDULED
Status: CANCELLED | OUTPATIENT
Start: 2023-07-10

## 2023-07-10 RX ORDER — PROPOFOL 10 MG/ML
INJECTION, EMULSION INTRAVENOUS PRN
Status: DISCONTINUED | OUTPATIENT
Start: 2023-07-10 | End: 2023-07-10 | Stop reason: SDUPTHER

## 2023-07-10 RX ORDER — SODIUM CHLORIDE, SODIUM LACTATE, POTASSIUM CHLORIDE, CALCIUM CHLORIDE 600; 310; 30; 20 MG/100ML; MG/100ML; MG/100ML; MG/100ML
INJECTION, SOLUTION INTRAVENOUS CONTINUOUS
Status: DISCONTINUED | OUTPATIENT
Start: 2023-07-10 | End: 2023-07-10 | Stop reason: HOSPADM

## 2023-07-10 RX ADMIN — SODIUM CHLORIDE, POTASSIUM CHLORIDE, SODIUM LACTATE AND CALCIUM CHLORIDE: 600; 310; 30; 20 INJECTION, SOLUTION INTRAVENOUS at 13:15

## 2023-07-10 RX ADMIN — PROPOFOL 10 MG: 10 INJECTION, EMULSION INTRAVENOUS at 13:55

## 2023-07-10 RX ADMIN — PROPOFOL 10 MG: 10 INJECTION, EMULSION INTRAVENOUS at 13:51

## 2023-07-10 RX ADMIN — PROPOFOL 10 MG: 10 INJECTION, EMULSION INTRAVENOUS at 13:50

## 2023-07-10 RX ADMIN — PROPOFOL 10 MG: 10 INJECTION, EMULSION INTRAVENOUS at 13:56

## 2023-07-10 RX ADMIN — FENTANYL CITRATE 50 MCG: 50 INJECTION INTRAMUSCULAR; INTRAVENOUS at 13:42

## 2023-07-10 RX ADMIN — FENTANYL CITRATE 25 MCG: 50 INJECTION INTRAMUSCULAR; INTRAVENOUS at 13:47

## 2023-07-10 RX ADMIN — FENTANYL CITRATE 25 MCG: 50 INJECTION INTRAMUSCULAR; INTRAVENOUS at 13:44

## 2023-07-10 RX ADMIN — ONDANSETRON 4 MG: 2 INJECTION INTRAMUSCULAR; INTRAVENOUS at 13:45

## 2023-07-10 RX ADMIN — PROPOFOL 10 MG: 10 INJECTION, EMULSION INTRAVENOUS at 13:43

## 2023-07-10 ASSESSMENT — PAIN - FUNCTIONAL ASSESSMENT: PAIN_FUNCTIONAL_ASSESSMENT: 0-10

## 2023-07-10 ASSESSMENT — PAIN SCALES - GENERAL
PAINLEVEL_OUTOF10: 0
PAINLEVEL_OUTOF10: 0

## 2023-07-10 ASSESSMENT — PAIN DESCRIPTION - DESCRIPTORS: DESCRIPTORS: ACHING

## 2023-07-10 NOTE — H&P
[address], [facility], [address] [place] [address]  [phone]    Procedure History & Physical      Guillermo Skill     HPI:    Patient  is here for low back and Left lower extremity pain for Left L2 and L3 TFESI. Labs/imaging studies reviewed   All question and concerns addressed including R/B/A associated with the procedure    Past Medical History:   Diagnosis Date    Depression     GERD (gastroesophageal reflux disease)     Hiatal hernia     Hyperlipidemia     Hypertension     Pneumonia     Sleep apnea     has c-pap uses       Past Surgical History:   Procedure Laterality Date    BLADDER REPAIR      x3    COLONOSCOPY      ENDOSCOPY, COLON, DIAGNOSTIC      FRACTURE SURGERY      right ankle    HERNIA REPAIR      HYSTERECTOMY (CERVIX STATUS UNKNOWN)      LAMINECTOMY Right 11/21/2022    L4-L5 LAMINECTOMY AND RIGHT L4-L5 DISCECTOMY performed by Antonio Oden MD at [address] [place] [address]  11/15/12    tenovaginotomy left ring finger    SKIN BIOPSY      TONSILLECTOMY         Prior to Admission medications    Medication Sig Start Date End Date Taking?  Authorizing Provider   acetaminophen (TYLENOL) 650 MG extended release tablet Take 1 tablet by mouth every 8 hours as needed for Pain    Historical Provider, MD   Calcium Polycarbophil (FIBERCON PO) Take by mouth in the morning and at bedtime    Historical Provider, MD   zinc 50 MG TABS tablet Take 1 tablet by mouth daily    Historical Provider, MD   Multiple Vitamins-Minerals (CENTRUM SILVER 50+WOMEN PO) Take by mouth daily    Historical Provider, MD   amLODIPine (NORVASC) 5 MG tablet Take 1 tablet by mouth daily AM 2/4/23   Historical Provider, MD   tiZANidine (ZANAFLEX) 4 MG tablet Take 1 tablet by mouth every 6 hours as needed (muscle spasm) 11/21/22   WINDY Bowling   buPROPion (WELLBUTRIN XL) 150 MG extended release tablet Take 1 tablet by mouth every evening    Historical Provider, MD   vitamin C (ASCORBIC ACID)

## 2023-07-10 NOTE — DISCHARGE INSTRUCTIONS
[facility]) Pain Management Department  [phone]   Post-Pain Block/ Radiofrequency Home Going Instructions    1-Go home, rest for the remainder of the day  2-Please do not lift over 20 pounds the day of the injection  3-If you received sedation No: alcohol, driving, operating lawn mowers, plows, tractors or other dangerous equipment until next morning. Do not make important decisions or sign legal documents for 24 hours. You may experience light headedness, dizziness, nausea or sleepiness after sedation. Do not stay alone. A responsible adult must be with you for 24 hours. You could be nauseated from the medications you have received. Your IV site may be sore and bruised. 4-No dietary restrictions     5-Resume all medications the same day, blood thinners to be resumed 24 hours after injection    6-Keep the surgical site clean and dry, you may shower the next morning and remove the      dressing. 7- No sitz baths, tub baths or hot tubs/swimming for 24 hours. 8- If you have any pain at the injection site(s), application of an ice pack to the area should be       helpful, 20 minutes on/20 minutes off for next 48 hours. 9- Call [facility] immediately at if you develop. Fever greater than 100.4 F  Have bleeding or drainage from the puncture site  Have progressive Leg/arm numbness and or weakness  Loss of control of bowel and or bladder (wet/soil yourself)  Severe headache with inability to lift head  10-You may return to work the next day          Infection After Surgery: Care Instructions  Overview  After surgery, an infection is always possible. It doesn't mean that the surgery didn't go well. Because an infection can be serious, your doctor has taken steps to manage it. Your doctor checked the infection and cleaned it if necessary. Your doctor may have made an opening in the area so that the pus can drain out.  You may have gauze in the cut so that the area will stay open and keep

## 2023-07-10 NOTE — ANESTHESIA POSTPROCEDURE EVALUATION
Department of Anesthesiology  Postprocedure Note    Patient: Salinas Tena  MRN: [id number]  YOB: 1940  Date of evaluation: 7/10/2023      Procedure Summary     Date: 07/10/23 Room / Location: [address] [place] [address] 04 / [address] Nini Mercado    Anesthesia Start: 1689 Anesthesia Stop: 5631    Procedure: Left L2 and L3 transforaminal epidural steroid injection.  SEDATION (Left: Back) Diagnosis:       Lumbar radiculopathy      (Lumbar radiculopathy [M54.16])    Surgeons: Dileep Baxter MD Responsible Provider: Maddi Andrews MD    Anesthesia Type: MAC ASA Status: 3          Anesthesia Type: MAC    Cris Phase I: Cris Score: 10    Cris Phase II: Cris Score: 10      Anesthesia Post Evaluation    Patient location during evaluation: PACU  Patient participation: complete - patient participated  Level of consciousness: awake and alert  Airway patency: patent  Nausea & Vomiting: no nausea and no vomiting  Complications: no  Cardiovascular status: hemodynamically stable  Respiratory status: room air and spontaneous ventilation  Hydration status: stable  Comments: No problems with retained jewelry

## 2023-07-10 NOTE — OP NOTE
7/10/2023    Patient: Enrique Noonan  :  1940  Age:  82 y.o. Sex:  female     PRE-OPERATIVE DIAGNOSIS: Lumbar disc displacement, lumbar radiculopathy. POST-OPERATIVE DIAGNOSIS: Same. PROCEDURE: Fluoroscopic guided therapeutic lumbar epidural steroid injection at the L3-4 level (# 1). Attempted Left L2 and L3 TFESI    SURGEON: DARÍO Gaona M.D.. ANESTHESIA: MAC    ESTIMATED BLOOD LOSS: None.  ______________________________________________________________________    BRIEF HISTORY:  Enrique Noonan comes in today for first lumbar epidural injection at L3-4 level. The potential complications of this procedure were discussed with her again today. She has elected to undergo the aforementioned procedure. Katja complete History & Physical examination were reviewed in depth, a copy of which is in the chart. DESCRIPTION OF PROCEDURE:    After confirming written and informed consent, a time-out was performed and Katja name and date of birth, the procedure to be performed as well as the plan for the location of the needle insertion were confirmed. The patient was brought into the procedure room and placed in the prone position on the fluoroscopy table. A pillow was placed under the patient's lower abdomen/upper pelvis to increase lumbar interlaminar space. Standard monitors were placed, and vital signs were observed throughout the procedure. The area of the lumbar spine was prepped with chloraprep and draped in a sterile manner. The L3-4 interspace was identified and marked under AP fluoroscopy. The skin and subcutaneous tissues at the above level were anesthestized with 0.5% lidocaine. With intermittent fluoroscopy, an # 18 gauge 3 1/2 tuohy epidural needle was inserted and directed toward the interlaminar space.  The needle was slowly advanced using loss of resistance technique and 5 cc glass syringe  until the tip of the epidural needle has passed through the ligamentum flavum and entered the epidural

## 2023-08-04 ENCOUNTER — OFFICE VISIT (OUTPATIENT)
Dept: PAIN MANAGEMENT | Age: 83
End: 2023-08-04
Payer: MEDICARE

## 2023-08-04 VITALS
HEART RATE: 95 BPM | HEIGHT: 63 IN | SYSTOLIC BLOOD PRESSURE: 124 MMHG | DIASTOLIC BLOOD PRESSURE: 70 MMHG | BODY MASS INDEX: 24.1 KG/M2 | OXYGEN SATURATION: 95 % | TEMPERATURE: 97.1 F | RESPIRATION RATE: 18 BRPM | WEIGHT: 136 LBS

## 2023-08-04 DIAGNOSIS — M47.816 LUMBAR SPONDYLOSIS: ICD-10-CM

## 2023-08-04 DIAGNOSIS — M47.816 LUMBAR FACET ARTHROPATHY: ICD-10-CM

## 2023-08-04 DIAGNOSIS — M25.552 PAIN IN LEFT HIP: ICD-10-CM

## 2023-08-04 DIAGNOSIS — M48.061 SPINAL STENOSIS OF LUMBAR REGION WITHOUT NEUROGENIC CLAUDICATION: ICD-10-CM

## 2023-08-04 DIAGNOSIS — M51.9 LUMBAR DISC DISORDER: ICD-10-CM

## 2023-08-04 DIAGNOSIS — G89.4 CHRONIC PAIN SYNDROME: Primary | ICD-10-CM

## 2023-08-04 DIAGNOSIS — M54.16 LUMBAR RADICULOPATHY: ICD-10-CM

## 2023-08-04 PROCEDURE — 1090F PRES/ABSN URINE INCON ASSESS: CPT | Performed by: PAIN MEDICINE

## 2023-08-04 PROCEDURE — 1123F ACP DISCUSS/DSCN MKR DOCD: CPT | Performed by: PAIN MEDICINE

## 2023-08-04 PROCEDURE — 99213 OFFICE O/P EST LOW 20 MIN: CPT | Performed by: PAIN MEDICINE

## 2023-08-04 PROCEDURE — G8400 PT W/DXA NO RESULTS DOC: HCPCS | Performed by: PAIN MEDICINE

## 2023-08-04 PROCEDURE — 1036F TOBACCO NON-USER: CPT | Performed by: PAIN MEDICINE

## 2023-08-04 PROCEDURE — G8427 DOCREV CUR MEDS BY ELIG CLIN: HCPCS | Performed by: PAIN MEDICINE

## 2023-08-04 PROCEDURE — 99214 OFFICE O/P EST MOD 30 MIN: CPT | Performed by: PAIN MEDICINE

## 2023-08-04 PROCEDURE — G8420 CALC BMI NORM PARAMETERS: HCPCS | Performed by: PAIN MEDICINE

## 2023-08-04 RX ORDER — GABAPENTIN 100 MG/1
200 CAPSULE ORAL DAILY
Qty: 60 CAPSULE | Refills: 0 | Status: SHIPPED | OUTPATIENT
Start: 2023-08-04 | End: 2023-09-03

## 2023-08-04 NOTE — PROGRESS NOTES
[address], [address] [place] [address]  [phone]    Follow up Note      Nitin Drain     Date of Visit:  8/4/2023    CC:  Patient presents for follow up   Chief Complaint   Patient presents with    Follow-up     Left L2 and L3 epidural steroid injection. SEDATION     HPI:    Pain is unchanged. Appropriate analgesia with current medications regimen: N/A. Change in quality of symptoms:no. Medication side effects:not applicable . Recent diagnostic testing:none. Recent interventional procedures:Left L2 and L3 TFESI with good relief but it only lasted two weeks. She has not been on anticoagulation medications to include none. The patient  has not been on herbal supplements. The patient is not diabetic. Imaging:   Lumbar spine MRI 2023   Degenerative changes and scoliosis result in moderate spinal stenosis and  mild mass effect on the cauda equina at L2-3, and milder spinal stenosis at  other levels as described above. Moderate left neural foraminal stenosis at L3-4 secondary to degenerative  changes and scoliosis. Milder neural foraminal stenosis at other levels as  described above. Roughly 2 cm x 6 cm x 4 cm rim enhancing fluid collection in the laminectomy  defect at L4-5 is suggestive of seroma, however abscess is not excluded. No  epidural abscess. Previous treatments: Physical Therapy, Epidural Steroid Injection, Surgery L4-L5 laminectomy, and medications. .        Potential Aberrant Drug-Related Behavior:    No    Urine Drug Screening:  None    OARRS report:  08/2023 consistent     Opioid Agreement:  Renewal date:N/A    Past Medical History:   Diagnosis Date    Depression     GERD (gastroesophageal reflux disease)     Hiatal hernia     Hyperlipidemia     Hypertension     Pneumonia     Sleep apnea     has c-pap uses     Past Surgical History:   Procedure Laterality Date    BLADDER REPAIR      x3    COLONOSCOPY      ENDOSCOPY,

## 2023-09-11 ENCOUNTER — PROCEDURE VISIT (OUTPATIENT)
Dept: PHYSICAL MEDICINE AND REHAB | Age: 83
End: 2023-09-11

## 2023-09-11 VITALS — BODY MASS INDEX: 24.84 KG/M2 | HEIGHT: 62 IN | WEIGHT: 135 LBS

## 2023-09-11 DIAGNOSIS — M54.16 LUMBAR RADICULOPATHY: ICD-10-CM

## 2023-09-11 NOTE — PATIENT INSTRUCTIONS
Electrodiagnotic Laboratory  Accredited [facility] the [facility] with Ex[facility]  NOAH Barrios D.O. [facility]  [address] [facility]. [address] [place] [address] Drive, [address] [place] [address]  Phone: [phone]  Fax: [phone]        Today you had an electrodiagnostic exam which included nerve conduction studies (NCS) and electromyography (EMG). This test evaluated the electrical activity of your nerves and muscles to help determine if you have a nerve or muscle disease. This test can help determine the location and type of a nerve or muscle problem. This will help your referring doctor diagnose your condition and determine the appropriate next step in your treatment plan. After your test:    1. There are no long lasting side effects of the test.     2. You may resume your normal activities without restrictions. 3.  Resume any medications that were stopped for the test.     4  If you have sore areas or bruising in your muscles where the needle was placed, apply a cold pack to the sore area for 15-20 minutes three to four times a day as needed for pain. The soreness should go away in about 1-2 days. 5. Your results were provided  Briefly at the end of your test and the final detailed report will be provided to your referring physician, and/or primary care physician and any other parties you requested within 1-2 days of the examination. You may wish to contact your referring provider after a few days to determine what they would like you to do next. 6.  Please call [phone] with any questions or concerns and if you develop increased body temperature/fever, swelling, tenderness, increased pain and/or drainage from the sites where the needle was placed. Thank you for choosing us for your health care needs.

## 2023-09-15 NOTE — PROGRESS NOTES
Electrodiagnostic Laboratory  *Accredited by the [facility] with exemplary status  [address], [address] [place] [address]  Phone: [phone]  Fax: [phone]      Date of Examination: 09/15/23    Patient Name: Vicki Reid    An independent historian was not needed. Vicki Reid  is a 82y.o. year old female who was seen today regarding   Chief Complaint   Patient presents with    Extremity Pain     Pain in the low back that radiates down to the left knee. Pain described as throbbing. Pain rated 10/10. Symptoms for years. Numbness     none    Extremity Weakness     Left leg weakness. .     The symptoms started after no known injury. The symptoms are constant. I have reviewed the referring provider's office note. There is not a family history of neuromuscular conditions. Physical Exam: General: The patient is in no apparent distress. MSK: There is no joint effusion, deformity, instability, swelling, erythema or warmth. AROM is full in the spine and extremities. +SLR left. Neurologic:  No focal sensorimotor deficit. Reflexes 2+ and symmetric. Gait is normal.    Impression:     1. Lumbar radiculopathy        Plan:   EMG is indicated to evaluate the above diagnosis. Orders Placed This Encounter   Procedures    DE NEEDLE EMG EA EXTREMTY W/PARASPINL AREA COMPLETE    DE NERVE CONDUCTION STUDIES 5-6 STUDIES     EMG was done today and showed left L4 radiculopathy. The patient was educated about the diagnosis and the prognosis. Correlation with lumbar imaging is recommended. Advised patient to follow up with referring provider. Thank you for allowing me to participate in the care of your patient.       Sincerely,     Ori Ramon,

## 2023-11-27 ENCOUNTER — OFFICE VISIT (OUTPATIENT)
Dept: NEUROSURGERY | Age: 83
End: 2023-11-27
Payer: MEDICARE

## 2023-11-27 DIAGNOSIS — Z98.890 S/P LAMINECTOMY: Primary | ICD-10-CM

## 2023-11-27 PROCEDURE — 99024 POSTOP FOLLOW-UP VISIT: CPT | Performed by: STUDENT IN AN ORGANIZED HEALTH CARE EDUCATION/TRAINING PROGRAM

## 2023-11-27 PROCEDURE — 99212 OFFICE O/P EST SF 10 MIN: CPT

## 2023-12-01 ENCOUNTER — OFFICE VISIT (OUTPATIENT)
Dept: PAIN MANAGEMENT | Age: 83
End: 2023-12-01
Payer: MEDICARE

## 2023-12-01 VITALS
OXYGEN SATURATION: 94 % | SYSTOLIC BLOOD PRESSURE: 132 MMHG | TEMPERATURE: 96.9 F | HEIGHT: 62 IN | BODY MASS INDEX: 24.84 KG/M2 | DIASTOLIC BLOOD PRESSURE: 60 MMHG | WEIGHT: 135 LBS | HEART RATE: 89 BPM | RESPIRATION RATE: 18 BRPM

## 2023-12-01 DIAGNOSIS — M47.816 LUMBAR FACET ARTHROPATHY: ICD-10-CM

## 2023-12-01 DIAGNOSIS — G89.4 CHRONIC PAIN SYNDROME: Primary | ICD-10-CM

## 2023-12-01 DIAGNOSIS — M51.9 LUMBAR DISC DISORDER: ICD-10-CM

## 2023-12-01 DIAGNOSIS — M48.061 SPINAL STENOSIS OF LUMBAR REGION WITHOUT NEUROGENIC CLAUDICATION: ICD-10-CM

## 2023-12-01 DIAGNOSIS — M25.552 PAIN IN LEFT HIP: ICD-10-CM

## 2023-12-01 DIAGNOSIS — M47.816 LUMBAR SPONDYLOSIS: ICD-10-CM

## 2023-12-01 DIAGNOSIS — M54.16 LUMBAR RADICULOPATHY: ICD-10-CM

## 2023-12-01 PROCEDURE — G8420 CALC BMI NORM PARAMETERS: HCPCS | Performed by: PAIN MEDICINE

## 2023-12-01 PROCEDURE — G8400 PT W/DXA NO RESULTS DOC: HCPCS | Performed by: PAIN MEDICINE

## 2023-12-01 PROCEDURE — 99213 OFFICE O/P EST LOW 20 MIN: CPT | Performed by: PAIN MEDICINE

## 2023-12-01 PROCEDURE — 1123F ACP DISCUSS/DSCN MKR DOCD: CPT | Performed by: PAIN MEDICINE

## 2023-12-01 PROCEDURE — 1090F PRES/ABSN URINE INCON ASSESS: CPT | Performed by: PAIN MEDICINE

## 2023-12-01 PROCEDURE — G8427 DOCREV CUR MEDS BY ELIG CLIN: HCPCS | Performed by: PAIN MEDICINE

## 2023-12-01 PROCEDURE — G8484 FLU IMMUNIZE NO ADMIN: HCPCS | Performed by: PAIN MEDICINE

## 2023-12-01 PROCEDURE — 1036F TOBACCO NON-USER: CPT | Performed by: PAIN MEDICINE

## 2023-12-01 RX ORDER — OXYCODONE HYDROCHLORIDE AND ACETAMINOPHEN 5; 325 MG/1; MG/1
TABLET ORAL
COMMUNITY
Start: 2023-11-03

## 2023-12-01 NOTE — PROGRESS NOTES
[address], [address] [place] [address]  [phone]    Follow up Note      Colten Esquivel     Date of Visit:  12/1/2023    CC:  Patient presents for follow up   Chief Complaint   Patient presents with    Back Pain     HPI:    Pain is unchanged. Appropriate analgesia with current medications regimen:No.    Change in quality of symptoms:no. Medication side effects:none  Recent diagnostic testing:none. Recent interventional procedures:none    She has not been on anticoagulation medications to include none. The patient  has not been on herbal supplements. The patient is not diabetic. Imaging:   Lumbar spine MRI 2023   Degenerative changes and scoliosis result in moderate spinal stenosis and  mild mass effect on the cauda equina at L2-3, and milder spinal stenosis at  other levels as described above. Moderate left neural foraminal stenosis at L3-4 secondary to degenerative  changes and scoliosis. Milder neural foraminal stenosis at other levels as  described above. Roughly 2 cm x 6 cm x 4 cm rim enhancing fluid collection in the laminectomy  defect at L4-5 is suggestive of seroma, however abscess is not excluded. No  epidural abscess. EMG Left lower extremity:  Electrodiagnosis: There is electrodiagnostic evidence of a lumbar radiculopathy. Location: left L4. Nature: [ X ] Axonal [ ] Demyelinating [ ] Mixed axonal and demyelinating     [ ] Sensory [ X ] Motor [ ] Mixed sensorimotor   Kuldip.Saver ] with active denervation [ ] without active denervation    Duration: Acute    Severity: mild    Prognosis: Poor. Previous Study: There is not a prior study for comparison. Left hip Xray Normal.     Previous treatments: Physical Therapy, Epidural Steroid Injection, Surgery L4-L5 laminectomy, and medications. .        Potential Aberrant Drug-Related Behavior:    No    Urine Drug Screening:  None    OARRS report:  12/2023 consistent     Opioid

## 2023-12-27 ENCOUNTER — OFFICE VISIT (OUTPATIENT)
Dept: PAIN MANAGEMENT | Age: 83
End: 2023-12-27
Payer: MEDICARE

## 2023-12-27 VITALS
HEART RATE: 91 BPM | OXYGEN SATURATION: 96 % | TEMPERATURE: 96.1 F | HEIGHT: 62 IN | RESPIRATION RATE: 18 BRPM | SYSTOLIC BLOOD PRESSURE: 122 MMHG | BODY MASS INDEX: 24.84 KG/M2 | WEIGHT: 135 LBS | DIASTOLIC BLOOD PRESSURE: 60 MMHG

## 2023-12-27 DIAGNOSIS — M47.816 LUMBAR FACET ARTHROPATHY: ICD-10-CM

## 2023-12-27 DIAGNOSIS — M25.552 PAIN IN LEFT HIP: ICD-10-CM

## 2023-12-27 DIAGNOSIS — M48.061 SPINAL STENOSIS OF LUMBAR REGION WITHOUT NEUROGENIC CLAUDICATION: ICD-10-CM

## 2023-12-27 DIAGNOSIS — M47.816 LUMBAR SPONDYLOSIS: ICD-10-CM

## 2023-12-27 DIAGNOSIS — M54.16 LUMBAR RADICULOPATHY: ICD-10-CM

## 2023-12-27 DIAGNOSIS — G89.4 CHRONIC PAIN SYNDROME: Primary | ICD-10-CM

## 2023-12-27 DIAGNOSIS — M51.9 LUMBAR DISC DISORDER: ICD-10-CM

## 2023-12-27 PROCEDURE — 1090F PRES/ABSN URINE INCON ASSESS: CPT | Performed by: PAIN MEDICINE

## 2023-12-27 PROCEDURE — G8427 DOCREV CUR MEDS BY ELIG CLIN: HCPCS | Performed by: PAIN MEDICINE

## 2023-12-27 PROCEDURE — 1036F TOBACCO NON-USER: CPT | Performed by: PAIN MEDICINE

## 2023-12-27 PROCEDURE — 99214 OFFICE O/P EST MOD 30 MIN: CPT | Performed by: PAIN MEDICINE

## 2023-12-27 PROCEDURE — G8400 PT W/DXA NO RESULTS DOC: HCPCS | Performed by: PAIN MEDICINE

## 2023-12-27 PROCEDURE — 99213 OFFICE O/P EST LOW 20 MIN: CPT | Performed by: PAIN MEDICINE

## 2023-12-27 PROCEDURE — G8420 CALC BMI NORM PARAMETERS: HCPCS | Performed by: PAIN MEDICINE

## 2023-12-27 PROCEDURE — G8484 FLU IMMUNIZE NO ADMIN: HCPCS | Performed by: PAIN MEDICINE

## 2023-12-27 PROCEDURE — 1123F ACP DISCUSS/DSCN MKR DOCD: CPT | Performed by: PAIN MEDICINE

## 2023-12-27 RX ORDER — SODIUM CHLORIDE 9 MG/ML
INJECTION, SOLUTION INTRAVENOUS PRN
OUTPATIENT
Start: 2023-12-27

## 2023-12-27 RX ORDER — SODIUM CHLORIDE 0.9 % (FLUSH) 0.9 %
5-40 SYRINGE (ML) INJECTION PRN
OUTPATIENT
Start: 2023-12-27

## 2023-12-27 RX ORDER — SODIUM CHLORIDE 0.9 % (FLUSH) 0.9 %
5-40 SYRINGE (ML) INJECTION EVERY 12 HOURS SCHEDULED
OUTPATIENT
Start: 2023-12-27

## 2024-01-07 ENCOUNTER — HOSPITAL ENCOUNTER (EMERGENCY)
Age: 84
Discharge: HOME | End: 2024-01-07
Payer: MEDICARE

## 2024-01-07 ENCOUNTER — APPOINTMENT (OUTPATIENT)
Dept: GENERAL RADIOLOGY | Age: 84
End: 2024-01-07
Payer: MEDICARE

## 2024-01-07 VITALS
DIASTOLIC BLOOD PRESSURE: 94 MMHG | WEIGHT: 135 LBS | RESPIRATION RATE: 20 BRPM | HEART RATE: 97 BPM | TEMPERATURE: 97.2 F | BODY MASS INDEX: 24.69 KG/M2 | SYSTOLIC BLOOD PRESSURE: 144 MMHG | OXYGEN SATURATION: 95 %

## 2024-01-07 DIAGNOSIS — S40.012A CONTUSION OF LEFT SHOULDER, INITIAL ENCOUNTER: Primary | ICD-10-CM

## 2024-01-07 DIAGNOSIS — S70.02XA CONTUSION OF LEFT HIP, INITIAL ENCOUNTER: ICD-10-CM

## 2024-01-07 PROCEDURE — 73030 X-RAY EXAM OF SHOULDER: CPT

## 2024-01-07 PROCEDURE — 73502 X-RAY EXAM HIP UNI 2-3 VIEWS: CPT

## 2024-01-07 ASSESSMENT — PAIN - FUNCTIONAL ASSESSMENT: PAIN_FUNCTIONAL_ASSESSMENT: NONE - DENIES PAIN

## 2024-01-07 NOTE — ED PROVIDER NOTES
SHOULDER LEFT (MIN 2 VIEWS)   Final Result   No acute abnormality of the hip.             Labs Reviewed - No data to display    When ordered only abnormal lab results are displayed. All other labs were within normal range or not returned as of this dictation.    Interpretation per the Radiologist below, if available at the time of this note:    XR HIP LEFT (2-3 VIEWS)   Final Result   No acute abnormality of the hip.         XR SHOULDER LEFT (MIN 2 VIEWS)   Final Result   No acute abnormality of the hip.           No results found.    No results found.     -------------------------------------------------PROCEDURES--------------------------------------------  Unless otherwise noted below, none      Procedures      ---EMERGENCY DEPARTMENT COURSE and DIFFERENTIAL DIAGNOSIS/MDM---  (CC/HPI Summary, DDx, ED Course, and Reassessment:) (Disposition Considerations (include 1 Tests not done, Admit vs D/C, Shared Decision Making, Pt Expectation of Test or Tx., Consults, Social Determinants, Chronic Conditiions, Records reviewed)    MDM  Number of Diagnoses or Management Options  Contusion of left hip, initial encounter  Contusion of left shoulder, initial encounter  Diagnosis management comments: This is a 83-year-old female in no acute distress with left shoulder and left hip pain after fall yesterday.  X-rays reveal no fracture or dislocation.  Instructions given.         DISCHARGE MEDICATIONS:  New Prescriptions    No medications on file       DISCONTINUED MEDICATIONS:  Discontinued Medications    No medications on file       PATIENT REFERRED TO:  Miguel Ángel Aguiar MD  [address] [address]  [phone]    Schedule an appointment as soon as possible for a visit         --------------------------------- ADDITIONAL PROVIDER NOTES ---------------------------------  I have spoken with the patient and discussed today’s results, in addition to providing specific details for the plan of care and counseling

## 2024-01-09 ENCOUNTER — TELEPHONE (OUTPATIENT)
Dept: PAIN MANAGEMENT | Age: 84
End: 2024-01-09

## 2024-01-09 DIAGNOSIS — M96.1 POSTLAMINECTOMY SYNDROME: ICD-10-CM

## 2024-01-09 DIAGNOSIS — M47.816 LUMBAR SPONDYLOSIS: Primary | ICD-10-CM

## 2024-01-09 DIAGNOSIS — M79.609 PAIN IN EXTREMITY, UNSPECIFIED EXTREMITY: ICD-10-CM

## 2024-01-09 NOTE — TELEPHONE ENCOUNTER
Call to Traci Armstrong that procedure was approved for 1/15/2024 and that [facility] should call her a few days before for the pre op call and after 3:00 PM the business day before with the arrival time. Instructed Traci to hold ibuprofen for 24 hours, naprosyn for 4 days and any aspirin containing products or fish oil for 7 days. Instructed to call office back if any questions. Traci verbalized understanding.    Traci is aware that she will have to have lab work done prior to her procedure.    Electronically signed by Chacho Conley RN on 1/9/2024 at 9:26 AM

## 2024-01-10 ENCOUNTER — ANESTHESIA EVENT (OUTPATIENT)
Dept: OPERATING ROOM | Age: 84
End: 2024-01-10
Payer: MEDICARE

## 2024-01-10 ENCOUNTER — HOSPITAL ENCOUNTER (OUTPATIENT)
Age: 84
Discharge: HOME | End: 2024-01-10
Payer: MEDICARE

## 2024-01-10 DIAGNOSIS — M47.816 LUMBAR SPONDYLOSIS: ICD-10-CM

## 2024-01-10 DIAGNOSIS — M79.609 PAIN IN EXTREMITY, UNSPECIFIED EXTREMITY: ICD-10-CM

## 2024-01-10 LAB
EKG ATRIAL RATE: 73 BPM
EKG P AXIS: 58 DEGREES
EKG P-R INTERVAL: 166 MS
EKG Q-T INTERVAL: 380 MS
EKG QRS DURATION: 82 MS
EKG QTC CALCULATION (BAZETT): 418 MS
EKG R AXIS: 5 DEGREES
EKG T AXIS: 44 DEGREES
EKG VENTRICULAR RATE: 73 BPM

## 2024-01-10 PROCEDURE — 93005 ELECTROCARDIOGRAM TRACING: CPT | Performed by: ANESTHESIOLOGY

## 2024-01-10 NOTE — ANESTHESIA PRE PROCEDURE
Department of Anesthesiology  Preprocedure Note       Name:  Traci Armstrong   Age:  83 y.o.  :  1940                                          MRN:  [id number]         Date:  1/10/2024      Surgeon: Surgeon(s):  Klever Dc MD    Procedure: Procedure(s):  Spinal cord stimulator trial with [place] SEDATION    Medications prior to admission:   Prior to Admission medications    Medication Sig Start Date End Date Taking? Authorizing Provider   Acetaminophen (TYLENOL ARTHRITIS PAIN PO) Take by mouth as needed   Yes Marie Pretty MD   oxyCODONE-acetaminophen (PERCOCET) 5-325 MG per tablet TAKE 1 TABLET BY MOUTH EVERY 6 HOURS FOR 7 DAYS AS NEEDED 11/3/23   Marie Pretty MD   gabapentin (NEURONTIN) 100 MG capsule Take 2 capsules by mouth Daily for 30 days. Intended supply: 90 days 23  Klever Dc MD   Calcium Polycarbophil (FIBERCON PO) Take by mouth in the morning and at bedtime    Marie Pretty MD   zinc 50 MG TABS tablet Take 1 tablet by mouth daily    Marie Pretty MD   Multiple Vitamins-Minerals (CENTRUM SILVER 50+WOMEN PO) Take by mouth daily    Marie Pretty MD   amLODIPine (NORVASC) 5 MG tablet Take 1 tablet by mouth daily AM 23   Marie Pretty MD   tiZANidine (ZANAFLEX) 4 MG tablet Take 1 tablet by mouth every 6 hours as needed (muscle spasm) 22   Bola Ziegler, PA   buPROPion (WELLBUTRIN XL) 150 MG extended release tablet Take 1 tablet by mouth every evening    Marie Pretty MD   vitamin C (ASCORBIC ACID) 500 MG tablet Take 2 tablets by mouth daily    Marie Pretty MD   valsartan-hydroCHLOROthiazide (DIOVAN-HCT) 80-12.5 MG per tablet Take 1 tablet by mouth daily 10/10/22   Marie Pretty MD   pantoprazole (PROTONIX) 40 MG tablet Take 1 tablet by mouth daily AM    Marie Pretty MD   CRANBERRY PO Take by mouth daily    Marie Pretty MD   MELATONIN PO Take 5 mg by mouth as needed    Maria De Jesus

## 2024-01-11 LAB
ANION GAP SERPL CALCULATED.3IONS-SCNC: 10 MMOL/L (ref 7–16)
BUN BLDV-MCNC: 17 MG/DL (ref 6–23)
CALCIUM SERPL-MCNC: 9.7 MG/DL (ref 8.6–10.2)
CHLORIDE BLD-SCNC: 101 MMOL/L (ref 98–107)
CO2: 27 MMOL/L (ref 22–29)
CREAT SERPL-MCNC: 0.7 MG/DL (ref 0.5–1)
GFR SERPL CREATININE-BSD FRML MDRD: >60 ML/MIN/1.73M2
GLUCOSE BLD-MCNC: 105 MG/DL (ref 74–99)
HCT VFR BLD CALC: 39.7 % (ref 34–48)
HEMOGLOBIN: 13.1 G/DL (ref 11.5–15.5)
INR BLD: 1
MCH RBC QN AUTO: 29 PG (ref 26–35)
MCHC RBC AUTO-ENTMCNC: 33 G/DL (ref 32–34.5)
MCV RBC AUTO: 88 FL (ref 80–99.9)
PDW BLD-RTO: 12.7 % (ref 11.5–15)
PLATELET # BLD: 355 K/UL (ref 130–450)
PMV BLD AUTO: 9.7 FL (ref 7–12)
POTASSIUM SERPL-SCNC: 4.2 MMOL/L (ref 3.5–5)
PROTHROMBIN TIME: 10.8 SEC (ref 9.3–12.4)
RBC # BLD: 4.51 M/UL (ref 3.5–5.5)
SODIUM BLD-SCNC: 138 MMOL/L (ref 132–146)
WBC # BLD: 6.9 K/UL (ref 4.5–11.5)

## 2024-01-15 ENCOUNTER — HOSPITAL ENCOUNTER (OUTPATIENT)
Dept: OPERATING ROOM | Age: 84
Setting detail: OUTPATIENT SURGERY
Discharge: HOME | End: 2024-01-15
Attending: PAIN MEDICINE
Payer: MEDICARE

## 2024-01-15 ENCOUNTER — HOSPITAL ENCOUNTER (OUTPATIENT)
Age: 84
Setting detail: OUTPATIENT SURGERY
Discharge: HOME | End: 2024-01-15
Attending: PAIN MEDICINE | Admitting: PAIN MEDICINE
Payer: MEDICARE

## 2024-01-15 ENCOUNTER — ANESTHESIA (OUTPATIENT)
Dept: OPERATING ROOM | Age: 84
End: 2024-01-15
Payer: MEDICARE

## 2024-01-15 VITALS
TEMPERATURE: 97 F | SYSTOLIC BLOOD PRESSURE: 153 MMHG | RESPIRATION RATE: 16 BRPM | HEART RATE: 71 BPM | DIASTOLIC BLOOD PRESSURE: 75 MMHG | BODY MASS INDEX: 25.21 KG/M2 | WEIGHT: 137 LBS | OXYGEN SATURATION: 96 % | HEIGHT: 62 IN

## 2024-01-15 DIAGNOSIS — M96.1 POSTLAMINECTOMY SYNDROME: ICD-10-CM

## 2024-01-15 DIAGNOSIS — M96.1 POSTLAMINECTOMY SYNDROME: Primary | ICD-10-CM

## 2024-01-15 PROCEDURE — 3600000015 HC SURGERY LEVEL 5 ADDTL 15MIN: Performed by: PAIN MEDICINE

## 2024-01-15 PROCEDURE — C1778 LEAD, NEUROSTIMULATOR: HCPCS | Performed by: PAIN MEDICINE

## 2024-01-15 PROCEDURE — 3700000001 HC ADD 15 MINUTES (ANESTHESIA): Performed by: PAIN MEDICINE

## 2024-01-15 PROCEDURE — 6360000002 HC RX W HCPCS

## 2024-01-15 PROCEDURE — C1889 IMPLANT/INSERT DEVICE, NOC: HCPCS | Performed by: PAIN MEDICINE

## 2024-01-15 PROCEDURE — 3700000000 HC ANESTHESIA ATTENDED CARE: Performed by: PAIN MEDICINE

## 2024-01-15 PROCEDURE — 7100000010 HC PHASE II RECOVERY - FIRST 15 MIN: Performed by: PAIN MEDICINE

## 2024-01-15 PROCEDURE — 6360000002 HC RX W HCPCS: Performed by: NURSE ANESTHETIST, CERTIFIED REGISTERED

## 2024-01-15 PROCEDURE — 3600000005 HC SURGERY LEVEL 5 BASE: Performed by: PAIN MEDICINE

## 2024-01-15 PROCEDURE — 2500000003 HC RX 250 WO HCPCS: Performed by: PAIN MEDICINE

## 2024-01-15 PROCEDURE — 7100000011 HC PHASE II RECOVERY - ADDTL 15 MIN: Performed by: PAIN MEDICINE

## 2024-01-15 PROCEDURE — 2580000003 HC RX 258: Performed by: ANESTHESIOLOGY

## 2024-01-15 PROCEDURE — 2709999900 HC NON-CHARGEABLE SUPPLY: Performed by: PAIN MEDICINE

## 2024-01-15 DEVICE — ANCHOR
Type: IMPLANTABLE DEVICE | Site: BACK | Status: FUNCTIONAL
Brand: CLIK™ X

## 2024-01-15 DEVICE — LEAD NEUROSTIMULATOR 16 CNTCT L50CM TRL INFINION: Type: IMPLANTABLE DEVICE | Site: BACK | Status: FUNCTIONAL

## 2024-01-15 RX ORDER — CEFAZOLIN 2 G/1
INJECTION, POWDER, FOR SOLUTION INTRAMUSCULAR; INTRAVENOUS
Status: COMPLETED
Start: 2024-01-15 | End: 2024-01-15

## 2024-01-15 RX ORDER — DIPHENHYDRAMINE HYDROCHLORIDE 50 MG/ML
12.5 INJECTION INTRAMUSCULAR; INTRAVENOUS
Status: CANCELLED | OUTPATIENT
Start: 2024-01-15 | End: 2024-01-16

## 2024-01-15 RX ORDER — DROPERIDOL 2.5 MG/ML
0.62 INJECTION, SOLUTION INTRAMUSCULAR; INTRAVENOUS
Status: CANCELLED | OUTPATIENT
Start: 2024-01-15 | End: 2024-01-16

## 2024-01-15 RX ORDER — OXYCODONE HYDROCHLORIDE AND ACETAMINOPHEN 5; 325 MG/1; MG/1
1 TABLET ORAL EVERY 4 HOURS PRN
Status: DISCONTINUED | OUTPATIENT
Start: 2024-01-15 | End: 2024-01-15 | Stop reason: HOSPADM

## 2024-01-15 RX ORDER — MIDAZOLAM HYDROCHLORIDE 1 MG/ML
INJECTION INTRAMUSCULAR; INTRAVENOUS PRN
Status: DISCONTINUED | OUTPATIENT
Start: 2024-01-15 | End: 2024-01-15 | Stop reason: SDUPTHER

## 2024-01-15 RX ORDER — SODIUM CHLORIDE 0.9 % (FLUSH) 0.9 %
5-40 SYRINGE (ML) INJECTION EVERY 12 HOURS SCHEDULED
Status: DISCONTINUED | OUTPATIENT
Start: 2024-01-15 | End: 2024-01-15 | Stop reason: HOSPADM

## 2024-01-15 RX ORDER — SODIUM CHLORIDE 0.9 % (FLUSH) 0.9 %
5-40 SYRINGE (ML) INJECTION PRN
Status: DISCONTINUED | OUTPATIENT
Start: 2024-01-15 | End: 2024-01-15 | Stop reason: HOSPADM

## 2024-01-15 RX ORDER — MORPHINE SULFATE 2 MG/ML
1 INJECTION, SOLUTION INTRAMUSCULAR; INTRAVENOUS EVERY 5 MIN PRN
Status: CANCELLED | OUTPATIENT
Start: 2024-01-15

## 2024-01-15 RX ORDER — SODIUM CHLORIDE 0.9 % (FLUSH) 0.9 %
5-40 SYRINGE (ML) INJECTION EVERY 12 HOURS SCHEDULED
Status: CANCELLED | OUTPATIENT
Start: 2024-01-15

## 2024-01-15 RX ORDER — DIPHENHYDRAMINE HYDROCHLORIDE 50 MG/ML
INJECTION INTRAMUSCULAR; INTRAVENOUS PRN
Status: DISCONTINUED | OUTPATIENT
Start: 2024-01-15 | End: 2024-01-15 | Stop reason: SDUPTHER

## 2024-01-15 RX ORDER — PROPOFOL 10 MG/ML
INJECTION, EMULSION INTRAVENOUS CONTINUOUS PRN
Status: DISCONTINUED | OUTPATIENT
Start: 2024-01-15 | End: 2024-01-15 | Stop reason: SDUPTHER

## 2024-01-15 RX ORDER — ONDANSETRON 2 MG/ML
INJECTION INTRAMUSCULAR; INTRAVENOUS PRN
Status: DISCONTINUED | OUTPATIENT
Start: 2024-01-15 | End: 2024-01-15 | Stop reason: SDUPTHER

## 2024-01-15 RX ORDER — SODIUM CHLORIDE 9 MG/ML
INJECTION, SOLUTION INTRAVENOUS PRN
Status: CANCELLED | OUTPATIENT
Start: 2024-01-15

## 2024-01-15 RX ORDER — SODIUM CHLORIDE, SODIUM LACTATE, POTASSIUM CHLORIDE, CALCIUM CHLORIDE 600; 310; 30; 20 MG/100ML; MG/100ML; MG/100ML; MG/100ML
INJECTION, SOLUTION INTRAVENOUS CONTINUOUS
Status: DISCONTINUED | OUTPATIENT
Start: 2024-01-15 | End: 2024-01-15 | Stop reason: HOSPADM

## 2024-01-15 RX ORDER — LIDOCAINE HYDROCHLORIDE 5 MG/ML
INJECTION, SOLUTION INFILTRATION; INTRAVENOUS PRN
Status: DISCONTINUED | OUTPATIENT
Start: 2024-01-15 | End: 2024-01-15 | Stop reason: ALTCHOICE

## 2024-01-15 RX ORDER — HYDROCODONE BITARTRATE AND ACETAMINOPHEN 5; 325 MG/1; MG/1
1 TABLET ORAL EVERY 6 HOURS PRN
Status: DISCONTINUED | OUTPATIENT
Start: 2024-01-15 | End: 2024-01-15 | Stop reason: HOSPADM

## 2024-01-15 RX ORDER — FENTANYL CITRATE 50 UG/ML
INJECTION, SOLUTION INTRAMUSCULAR; INTRAVENOUS PRN
Status: DISCONTINUED | OUTPATIENT
Start: 2024-01-15 | End: 2024-01-15 | Stop reason: SDUPTHER

## 2024-01-15 RX ORDER — CEPHALEXIN 250 MG/1
250 CAPSULE ORAL 3 TIMES DAILY
Qty: 21 CAPSULE | Refills: 0 | Status: SHIPPED | OUTPATIENT
Start: 2024-01-15 | End: 2024-01-22

## 2024-01-15 RX ORDER — SODIUM CHLORIDE 9 MG/ML
INJECTION, SOLUTION INTRAVENOUS PRN
Status: DISCONTINUED | OUTPATIENT
Start: 2024-01-15 | End: 2024-01-15 | Stop reason: HOSPADM

## 2024-01-15 RX ORDER — FENTANYL CITRATE 0.05 MG/ML
50 INJECTION, SOLUTION INTRAMUSCULAR; INTRAVENOUS EVERY 5 MIN PRN
Status: CANCELLED | OUTPATIENT
Start: 2024-01-15

## 2024-01-15 RX ORDER — SODIUM CHLORIDE 0.9 % (FLUSH) 0.9 %
5-40 SYRINGE (ML) INJECTION PRN
Status: CANCELLED | OUTPATIENT
Start: 2024-01-15

## 2024-01-15 RX ADMIN — CEFAZOLIN 2000 MG: 2 INJECTION, POWDER, FOR SOLUTION INTRAMUSCULAR; INTRAVENOUS at 08:20

## 2024-01-15 RX ADMIN — FENTANYL CITRATE 50 MCG: 50 INJECTION INTRAMUSCULAR; INTRAVENOUS at 08:16

## 2024-01-15 RX ADMIN — ONDANSETRON 4 MG: 2 INJECTION INTRAMUSCULAR; INTRAVENOUS at 08:16

## 2024-01-15 RX ADMIN — MIDAZOLAM 1 MG: 1 INJECTION INTRAMUSCULAR; INTRAVENOUS at 08:15

## 2024-01-15 RX ADMIN — PROPOFOL INJECTABLE EMULSION 50 MCG/KG/MIN: 10 INJECTION, EMULSION INTRAVENOUS at 08:16

## 2024-01-15 RX ADMIN — SODIUM CHLORIDE, POTASSIUM CHLORIDE, SODIUM LACTATE AND CALCIUM CHLORIDE: 600; 310; 30; 20 INJECTION, SOLUTION INTRAVENOUS at 07:36

## 2024-01-15 RX ADMIN — DIPHENHYDRAMINE HYDROCHLORIDE 12.5 MG: 50 INJECTION, SOLUTION INTRAMUSCULAR; INTRAVENOUS at 08:18

## 2024-01-15 ASSESSMENT — PAIN - FUNCTIONAL ASSESSMENT
PAIN_FUNCTIONAL_ASSESSMENT: NONE - DENIES PAIN
PAIN_FUNCTIONAL_ASSESSMENT: 0-10
PAIN_FUNCTIONAL_ASSESSMENT: NONE - DENIES PAIN
PAIN_FUNCTIONAL_ASSESSMENT: NONE - DENIES PAIN

## 2024-01-15 ASSESSMENT — PAIN DESCRIPTION - DESCRIPTORS: DESCRIPTORS: ACHING

## 2024-01-15 NOTE — OP NOTE
external pulse generator using sterile connectors. Several combinations of electrode configuration, frequency, amplitude and pulse width were used until comfortable, appropriate coverage of the patient's pain area was obtained.The needles(s) were then carefully removed under live fluoroscopy and the lead(s) were secured to the skin using plastic twist-lock anchors and 2-0 vicryl sutures.  Fluoroscopy was used to confirm continued proper placement of the lead(s) before being anchored . The anchors were covered with a gauze dressing under the anchor(s) to pad the skin.The connectors were also wrapped in gauze and secured to the patient.  Patient back was then cleansed and opsites were placed to cover the leads and the connectors, more programming was performed in the recovery area with the device representative.    Disposition the patient tolerated the procedure well and there were no complications . Vital signs remained stable throughout the procedure. The patient was escorted to the recovery area where they remained until discharge and written discharge instructions for the procedure were given.    Patient received 2 gram of ancef preop    Plan: Traci will return to our pain management center as scheduled.     RICK YA MD

## 2024-01-15 NOTE — ANESTHESIA POSTPROCEDURE EVALUATION
Department of Anesthesiology  Postprocedure Note    Patient: Traci Armstrong  MRN: [id number]  YOB: 1940  Date of evaluation: 1/15/2024    Procedure Summary       Date: 01/15/24 Room / Location: [address]    Anesthesia Start: 0812 Anesthesia Stop: 0856    Procedure: Spinal cord stimulator trial with [place] (Back) Diagnosis:       Postlaminectomy syndrome      (Postlaminectomy syndrome [M96.1])    Surgeons: Klever Dc MD Responsible Provider: Petey Muñoz MD    Anesthesia Type: MAC ASA Status: 3            Anesthesia Type: MAC    Cris Phase I: Cris Score: 10    Cris Phase II: Cris Score: 10    Anesthesia Post Evaluation    Patient location during evaluation: PACU  Patient participation: complete - patient participated  Level of consciousness: awake  Airway patency: patent  Nausea & Vomiting: no nausea and no vomiting  Cardiovascular status: hemodynamically stable  Respiratory status: room air and spontaneous ventilation  Hydration status: stable  Pain management: satisfactory to patient    No notable events documented.

## 2024-01-15 NOTE — DISCHARGE INSTRUCTIONS
[facility]  Dr. Dc/  [phone]    Spinal Cord Stimulator Trial/Implant  Discharge Instructions    -No bending,twisting, stretching,heavy lifting until further instructed by physician  -Keep dressing clean, dry and intact do not remove. Do not get dressing wet until seen by Dr.  -Resume regular diet  -DO NOT RESUME BLOOD THINNERS TILL SEEN BY PHYSCIAN  -Resume other normal medications  -No driving for 24 hours ONLY FOR TRIALS  -NO DRIVING FOR STIMULATOR IMPLANTS TILL SEEN BY DOCTOR  -Follow up with physician in the office at scheduled appointment, or call the office to schedule an appointment if needed  -If wearing a binder please keep it on till follow up visit with Doctor.           Nausea and Vomiting After Surgery: Care Instructions  Your Care Instructions     After you've had surgery, you may feel sick to your stomach (nauseated) or you may vomit. Sometimes anesthesia can make you feel sick. It's a common side effect and often doesn't last long. Pain also can make you feel sick or vomit. After the anesthesia wears off, you may feel pain from the incision (cut). That pain could then upset your stomach. Taking pain medicine can also make you feel sick to your stomach.  Whatever the cause, you may get medicine that can help. There are also some things you can do at home to prevent nausea and feel better.  The doctor has checked you carefully, but problems can develop later. If you notice any problems or new symptoms, get medical treatment right away.  Follow-up care is a key part of your treatment and safety. Be sure to make and go to all appointments, and call your doctor if you are having problems. It's also a good idea to know your test results and keep a list of the medicines you take.  How can you care for yourself at home?  Be safe with medicines. Read and follow all instructions on the label.  If the doctor gave you a prescription medicine for pain, take it as

## 2024-01-15 NOTE — H&P
Marie Pretty MD   amLODIPine (NORVASC) 5 MG tablet Take 1 tablet by mouth daily AM 2/4/23   Marie Pretty MD   tiZANidine (ZANAFLEX) 4 MG tablet Take 1 tablet by mouth every 6 hours as needed (muscle spasm) 11/21/22   Bola Ziegler PA   buPROPion (WELLBUTRIN XL) 150 MG extended release tablet Take 1 tablet by mouth every evening    Marie Pretty MD   vitamin C (ASCORBIC ACID) 500 MG tablet Take 2 tablets by mouth daily    Marie Pretty MD   valsartan-hydroCHLOROthiazide (DIOVAN-HCT) 80-12.5 MG per tablet Take 1 tablet by mouth daily 10/10/22   Marie Pretty MD   pantoprazole (PROTONIX) 40 MG tablet Take 1 tablet by mouth daily AM    Marie Pretty MD   CRANBERRY PO Take by mouth daily    Marie Pretty MD   MELATONIN PO Take 5 mg by mouth as needed    Marie Pretty MD   FLUTICASONE FUROATE NA by Nasal route as needed    Marie Pretty MD   BIOTIN PO Take by mouth daily    Marie Pretty MD   sertraline (ZOLOFT) 100 MG tablet Take 0.5 tablets by mouth daily    Marie Pretty MD   Calcium Carb-Cholecalciferol (CALCIUM 600+D3 PO) Take 1 tablet by mouth 2 times daily.      Marie Pretty MD   Vitamin D (CHOLECALCIFEROL) 1000 UNITS CAPS capsule Take 2 capsules by mouth daily    Marie Pretty MD       Allergies   Allergen Reactions    Aspirin Anaphylaxis    Propoxyphene     Venlafaxine Hcl     Darvocet [Propoxyphene N-Acetaminophen] Nausea And Vomiting    Demerol Nausea And Vomiting       Social History     Socioeconomic History    Marital status:      Spouse name: Not on file    Number of children: Not on file    Years of education: Not on file    Highest education level: Not on file   Occupational History    Not on file   Tobacco Use    Smoking status: Never    Smokeless tobacco: Never   Vaping Use    Vaping Use: Never used   Substance and Sexual Activity    Alcohol use: No    Drug use: Never    Sexual

## 2024-01-23 ENCOUNTER — OFFICE VISIT (OUTPATIENT)
Dept: PAIN MANAGEMENT | Age: 84
End: 2024-01-23
Payer: MEDICARE

## 2024-01-23 VITALS
TEMPERATURE: 95.9 F | BODY MASS INDEX: 25.21 KG/M2 | HEART RATE: 87 BPM | HEIGHT: 62 IN | RESPIRATION RATE: 18 BRPM | DIASTOLIC BLOOD PRESSURE: 60 MMHG | SYSTOLIC BLOOD PRESSURE: 122 MMHG | WEIGHT: 137 LBS | OXYGEN SATURATION: 95 %

## 2024-01-23 DIAGNOSIS — M47.816 LUMBAR SPONDYLOSIS: ICD-10-CM

## 2024-01-23 DIAGNOSIS — M48.061 SPINAL STENOSIS OF LUMBAR REGION WITHOUT NEUROGENIC CLAUDICATION: ICD-10-CM

## 2024-01-23 DIAGNOSIS — M54.16 LUMBAR RADICULOPATHY: ICD-10-CM

## 2024-01-23 DIAGNOSIS — M47.816 LUMBAR FACET ARTHROPATHY: ICD-10-CM

## 2024-01-23 DIAGNOSIS — M96.1 POSTLAMINECTOMY SYNDROME: ICD-10-CM

## 2024-01-23 DIAGNOSIS — G89.4 CHRONIC PAIN SYNDROME: Primary | ICD-10-CM

## 2024-01-23 DIAGNOSIS — M25.552 PAIN IN LEFT HIP: ICD-10-CM

## 2024-01-23 DIAGNOSIS — M79.609 PAIN IN EXTREMITY, UNSPECIFIED EXTREMITY: ICD-10-CM

## 2024-01-23 DIAGNOSIS — M51.9 LUMBAR DISC DISORDER: ICD-10-CM

## 2024-01-23 PROCEDURE — G8419 CALC BMI OUT NRM PARAM NOF/U: HCPCS | Performed by: PAIN MEDICINE

## 2024-01-23 PROCEDURE — G8427 DOCREV CUR MEDS BY ELIG CLIN: HCPCS | Performed by: PAIN MEDICINE

## 2024-01-23 PROCEDURE — 1090F PRES/ABSN URINE INCON ASSESS: CPT | Performed by: PAIN MEDICINE

## 2024-01-23 PROCEDURE — 1123F ACP DISCUSS/DSCN MKR DOCD: CPT | Performed by: PAIN MEDICINE

## 2024-01-23 PROCEDURE — G8484 FLU IMMUNIZE NO ADMIN: HCPCS | Performed by: PAIN MEDICINE

## 2024-01-23 PROCEDURE — 99214 OFFICE O/P EST MOD 30 MIN: CPT | Performed by: PAIN MEDICINE

## 2024-01-23 PROCEDURE — G8400 PT W/DXA NO RESULTS DOC: HCPCS | Performed by: PAIN MEDICINE

## 2024-01-23 PROCEDURE — 1036F TOBACCO NON-USER: CPT | Performed by: PAIN MEDICINE

## 2024-01-23 PROCEDURE — 99213 OFFICE O/P EST LOW 20 MIN: CPT | Performed by: PAIN MEDICINE

## 2024-01-23 RX ORDER — SODIUM CHLORIDE 0.9 % (FLUSH) 0.9 %
5-40 SYRINGE (ML) INJECTION PRN
OUTPATIENT
Start: 2024-01-23

## 2024-01-23 RX ORDER — SODIUM CHLORIDE 9 MG/ML
INJECTION, SOLUTION INTRAVENOUS PRN
OUTPATIENT
Start: 2024-01-23

## 2024-01-23 RX ORDER — SODIUM CHLORIDE 0.9 % (FLUSH) 0.9 %
5-40 SYRINGE (ML) INJECTION EVERY 12 HOURS SCHEDULED
OUTPATIENT
Start: 2024-01-23

## 2024-01-23 NOTE — PROGRESS NOTES
Traci Armstrong presents to the [facility] on 1/23/2024. Traci is complaining of pain low back, left thigh. The pain is intermittent. The pain is described as aching, stabbing, sharp, and penetrating. Pain is rated on her best day at a 0, on her worst day at a 4, and on average at a 6 on the VAS scale. She took her last dose of Percocet and Tylenol not since procedure.    Any procedures since your last visit: Yes,   Spinal cord stimulator trial with [place] with 90 % relief.    She is not on NSAIDS and  is not on anticoagulation medications to include none   Pacemaker or defibrillator: No   Medication Contract and Consent for Opioid Use Documents Filed        No documents found                                                                                                                                 
radiation to the Left thigh and groin with h/o L4-5 laminectomy.  Lumbar spine MRI moderate L2-3 stenosis with Left L3-4 foraminal stenosis   Plan:  Follow up on her low back and Left lower extremity pain with no acute issues.  Patient is s/p SCS trial with more than 70% improvement in her pain. She was able to function and sleep better on a daily basis.  Discussed SCS implant including percutaneous and surgical leads. Discussed R/B/A and possibility of infection.  Patient agrees to proceed with percutaneous leads.  OARRS report reviewed 01/2024.  Patient encouraged to stay active and to lose weight.  Treatment plan discussed with the patient including medications and procedure side effects.     We discussed with the patient that combining opioids, benzodiazepines, alcohol, illicit drugs or sleep aids increases the risk of respiratory depression including death. We discussed that these medications may cause drowsiness, sedation or dizziness and have counseled the patient not to drive or operate machinery. We have discussed that these medications will be prescribed only by one provider. We have discussed with the patient about age related risk factors and have thoroughly discussed the importance of taking these medications as prescribed. The patient verbalizes understanding.    kiesha Escobar M.D.

## 2024-02-08 ENCOUNTER — TELEPHONE (OUTPATIENT)
Dept: PAIN MANAGEMENT | Age: 84
End: 2024-02-08

## 2024-02-08 DIAGNOSIS — M79.609 PAIN IN EXTREMITY, UNSPECIFIED EXTREMITY: Primary | ICD-10-CM

## 2024-02-08 NOTE — TELEPHONE ENCOUNTER
Call to Tracicarmen Armstrong that procedure was approved for 2/23/2024 and that [facility] should call her a few days before for the pre op call and after 3:00 PM the business day before with the arrival time. Instructed Traci to hold ibuprofen for 24 hours, naprosyn for 4 days and any aspirin containing products or fish oil for 7 days. Instructed to call office back if any questions. Traci verbalized understanding.    She is aware that she will have to have pr-op labs, orders placed.    Electronically signed by Chacho Conley RN on 2/8/2024 at 9:50 AM

## 2024-02-15 RX ORDER — SODIUM CHLORIDE, SODIUM LACTATE, POTASSIUM CHLORIDE, CALCIUM CHLORIDE 600; 310; 30; 20 MG/100ML; MG/100ML; MG/100ML; MG/100ML
INJECTION, SOLUTION INTRAVENOUS CONTINUOUS
Status: CANCELLED | OUTPATIENT
Start: 2024-02-23

## 2024-02-19 ENCOUNTER — HOSPITAL ENCOUNTER (OUTPATIENT)
Dept: PREADMISSION TESTING | Age: 84
Discharge: HOME | End: 2024-02-19
Payer: MEDICARE

## 2024-02-19 VITALS
TEMPERATURE: 97.4 F | DIASTOLIC BLOOD PRESSURE: 86 MMHG | OXYGEN SATURATION: 96 % | BODY MASS INDEX: 24.84 KG/M2 | HEIGHT: 62 IN | SYSTOLIC BLOOD PRESSURE: 145 MMHG | RESPIRATION RATE: 18 BRPM | HEART RATE: 88 BPM | WEIGHT: 135 LBS

## 2024-02-19 DIAGNOSIS — Z01.818 PRE-OP TESTING: Primary | ICD-10-CM

## 2024-02-19 DIAGNOSIS — M79.609 PAIN IN EXTREMITY, UNSPECIFIED EXTREMITY: ICD-10-CM

## 2024-02-19 LAB
ANION GAP SERPL CALCULATED.3IONS-SCNC: 11 MMOL/L (ref 7–16)
BUN SERPL-MCNC: 19 MG/DL (ref 6–23)
CALCIUM SERPL-MCNC: 9.7 MG/DL (ref 8.6–10.2)
CHLORIDE SERPL-SCNC: 101 MMOL/L (ref 98–107)
CO2 SERPL-SCNC: 29 MMOL/L (ref 22–29)
CREAT SERPL-MCNC: 0.7 MG/DL (ref 0.5–1)
ERYTHROCYTE [DISTWIDTH] IN BLOOD BY AUTOMATED COUNT: 12.5 % (ref 11.5–15)
GFR SERPL CREATININE-BSD FRML MDRD: >60 ML/MIN/1.73M2
GLUCOSE SERPL-MCNC: 100 MG/DL (ref 74–99)
HCT VFR BLD AUTO: 42 % (ref 34–48)
HGB BLD-MCNC: 14 G/DL (ref 11.5–15.5)
INR PPP: 0.9
MCH RBC QN AUTO: 28.9 PG (ref 26–35)
MCHC RBC AUTO-ENTMCNC: 33.3 G/DL (ref 32–34.5)
MCV RBC AUTO: 86.8 FL (ref 80–99.9)
PLATELET # BLD AUTO: 341 K/UL (ref 130–450)
PMV BLD AUTO: 9.6 FL (ref 7–12)
POTASSIUM SERPL-SCNC: 4.1 MMOL/L (ref 3.5–5)
PROTHROMBIN TIME: 10.3 SEC (ref 9.3–12.4)
RBC # BLD AUTO: 4.84 M/UL (ref 3.5–5.5)
SODIUM SERPL-SCNC: 141 MMOL/L (ref 132–146)
WBC OTHER # BLD: 6.3 K/UL (ref 4.5–11.5)

## 2024-02-19 PROCEDURE — 80048 BASIC METABOLIC PNL TOTAL CA: CPT

## 2024-02-19 PROCEDURE — 85610 PROTHROMBIN TIME: CPT

## 2024-02-19 PROCEDURE — 87081 CULTURE SCREEN ONLY: CPT

## 2024-02-19 PROCEDURE — 85027 COMPLETE CBC AUTOMATED: CPT

## 2024-02-19 ASSESSMENT — PAIN DESCRIPTION - PAIN TYPE: TYPE: CHRONIC PAIN

## 2024-02-19 ASSESSMENT — PAIN - FUNCTIONAL ASSESSMENT: PAIN_FUNCTIONAL_ASSESSMENT: PREVENTS OR INTERFERES SOME ACTIVE ACTIVITIES AND ADLS

## 2024-02-19 ASSESSMENT — PAIN SCALES - GENERAL: PAINLEVEL_OUTOF10: 10

## 2024-02-19 ASSESSMENT — PAIN DESCRIPTION - LOCATION: LOCATION: BACK

## 2024-02-19 ASSESSMENT — PAIN DESCRIPTION - DESCRIPTORS: DESCRIPTORS: ACHING

## 2024-02-19 NOTE — PROGRESS NOTES
[facility]                                                                                                                    PRE OP INSTRUCTIONS FOR  Traci Armstrong        Date: 2/19/2024    Date of surgery: 02/23/24   Arrival Time: Hospital will call you between 5pm and 7pm with your final arrival time for surgery    You may drink clear liquids up until 2 hours before your procedure. No solid foods for 8 hours pre procedure.       Take the following medications with a small sip of water on the morning of Surgery: Zoloft, amlodipine, pantoprazole       Aspirin, Ibuprofen, Advil, Naproxen, Vitamin E and other Anti-inflammatory products should be stopped  before surgery  as directed by your physician.  Take Tylenol only unless instructed otherwise by your surgeon.    Do not smoke,use illicit drugs and do not drink any alcoholic beverages 24 hours prior to surgery.    You may brush your teeth the morning of surgery.  DO NOT SWALLOW WATER    You MUST make arrangements for a responsible adult to take you home after your surgery. You will not be allowed to leave alone or drive yourself home.  It is strongly suggested someone stay with you the first 24 hrs. Your surgery will be cancelled if you do not have a ride home.    Please wear simple, loose fitting clothing to the hospital.  Do not bring valuables (money, credit cards, checkbooks, etc.) Do not wear any makeup (including no eye makeup) or nail polish on your fingers or toes.    DO NOT wear any jewelry or piercings on day of surgery.  All body piercing jewelry must be removed.    Shower the night before surgery with _x__Antibacterial soap /DESTINY WIPES____x____      If you have a Living Will and Durable Power of  for Healthcare, please bring in a copy.    If appropriate bring crutches, inspirex, WALKER, CANE etc...    Notify your Surgeon if you develop any illness between now and surgery time, cough, cold, fever, sore throat, nausea,

## 2024-02-20 LAB
MICROORGANISM SPEC CULT: NORMAL
SPECIMEN DESCRIPTION: NORMAL

## 2024-02-21 ENCOUNTER — ANESTHESIA EVENT (OUTPATIENT)
Dept: OPERATING ROOM | Age: 84
End: 2024-02-21
Payer: MEDICARE

## 2024-02-23 ENCOUNTER — HOSPITAL ENCOUNTER (OUTPATIENT)
Age: 84
Setting detail: OUTPATIENT SURGERY
Discharge: HOME | End: 2024-02-23
Attending: PAIN MEDICINE | Admitting: PAIN MEDICINE
Payer: MEDICARE

## 2024-02-23 ENCOUNTER — HOSPITAL ENCOUNTER (OUTPATIENT)
Dept: GENERAL RADIOLOGY | Age: 84
End: 2024-02-23
Payer: MEDICARE

## 2024-02-23 ENCOUNTER — ANESTHESIA (OUTPATIENT)
Dept: OPERATING ROOM | Age: 84
End: 2024-02-23
Payer: MEDICARE

## 2024-02-23 VITALS
SYSTOLIC BLOOD PRESSURE: 145 MMHG | WEIGHT: 135 LBS | OXYGEN SATURATION: 92 % | DIASTOLIC BLOOD PRESSURE: 71 MMHG | HEART RATE: 81 BPM | BODY MASS INDEX: 24.84 KG/M2 | HEIGHT: 62 IN | TEMPERATURE: 96.7 F | RESPIRATION RATE: 20 BRPM

## 2024-02-23 DIAGNOSIS — M54.9 BACK PAIN, UNSPECIFIED BACK LOCATION, UNSPECIFIED BACK PAIN LATERALITY, UNSPECIFIED CHRONICITY: ICD-10-CM

## 2024-02-23 DIAGNOSIS — M96.1 POSTLAMINECTOMY SYNDROME: Primary | ICD-10-CM

## 2024-02-23 PROCEDURE — 6360000002 HC RX W HCPCS

## 2024-02-23 PROCEDURE — 2500000003 HC RX 250 WO HCPCS: Performed by: PAIN MEDICINE

## 2024-02-23 PROCEDURE — 3700000000 HC ANESTHESIA ATTENDED CARE: Performed by: PAIN MEDICINE

## 2024-02-23 PROCEDURE — C1889 IMPLANT/INSERT DEVICE, NOC: HCPCS | Performed by: PAIN MEDICINE

## 2024-02-23 PROCEDURE — A4217 STERILE WATER/SALINE, 500 ML: HCPCS | Performed by: PAIN MEDICINE

## 2024-02-23 PROCEDURE — 6360000002 HC RX W HCPCS: Performed by: ANESTHESIOLOGY

## 2024-02-23 PROCEDURE — 7100000000 HC PACU RECOVERY - FIRST 15 MIN: Performed by: PAIN MEDICINE

## 2024-02-23 PROCEDURE — 3700000001 HC ADD 15 MINUTES (ANESTHESIA): Performed by: PAIN MEDICINE

## 2024-02-23 PROCEDURE — 2720000010 HC SURG SUPPLY STERILE: Performed by: PAIN MEDICINE

## 2024-02-23 PROCEDURE — C1778 LEAD, NEUROSTIMULATOR: HCPCS | Performed by: PAIN MEDICINE

## 2024-02-23 PROCEDURE — 7100000001 HC PACU RECOVERY - ADDTL 15 MIN: Performed by: PAIN MEDICINE

## 2024-02-23 PROCEDURE — 3600000015 HC SURGERY LEVEL 5 ADDTL 15MIN: Performed by: PAIN MEDICINE

## 2024-02-23 PROCEDURE — C1787 PATIENT PROGR, NEUROSTIM: HCPCS | Performed by: PAIN MEDICINE

## 2024-02-23 PROCEDURE — 7100000010 HC PHASE II RECOVERY - FIRST 15 MIN: Performed by: PAIN MEDICINE

## 2024-02-23 PROCEDURE — C1820 GENERATOR NEURO RECHG BAT SY: HCPCS | Performed by: PAIN MEDICINE

## 2024-02-23 PROCEDURE — 6360000002 HC RX W HCPCS: Performed by: NURSE ANESTHETIST, CERTIFIED REGISTERED

## 2024-02-23 PROCEDURE — 2580000003 HC RX 258: Performed by: PAIN MEDICINE

## 2024-02-23 PROCEDURE — 2709999900 HC NON-CHARGEABLE SUPPLY: Performed by: PAIN MEDICINE

## 2024-02-23 PROCEDURE — 6360000002 HC RX W HCPCS: Performed by: PAIN MEDICINE

## 2024-02-23 PROCEDURE — 3600000005 HC SURGERY LEVEL 5 BASE: Performed by: PAIN MEDICINE

## 2024-02-23 PROCEDURE — 2580000003 HC RX 258: Performed by: ANESTHESIOLOGY

## 2024-02-23 PROCEDURE — 7100000011 HC PHASE II RECOVERY - ADDTL 15 MIN: Performed by: PAIN MEDICINE

## 2024-02-23 DEVICE — ANCHOR
Type: IMPLANTABLE DEVICE | Site: BACK | Status: FUNCTIONAL
Brand: CLIK™ X

## 2024-02-23 DEVICE — IMPLANTABLE PULSE GENERATOR KIT
Type: IMPLANTABLE DEVICE | Site: BACK | Status: FUNCTIONAL
Brand: WAVEWRITER ALPHA™ 16

## 2024-02-23 DEVICE — 50CM 8 CONTACT LEAD KIT
Type: IMPLANTABLE DEVICE | Site: BACK | Status: FUNCTIONAL
Brand: LINEAR™ ST

## 2024-02-23 RX ORDER — VANCOMYCIN HYDROCHLORIDE 1 G/20ML
INJECTION, POWDER, LYOPHILIZED, FOR SOLUTION INTRAVENOUS PRN
Status: DISCONTINUED | OUTPATIENT
Start: 2024-02-23 | End: 2024-02-23 | Stop reason: ALTCHOICE

## 2024-02-23 RX ORDER — PROPOFOL 10 MG/ML
INJECTION, EMULSION INTRAVENOUS PRN
Status: DISCONTINUED | OUTPATIENT
Start: 2024-02-23 | End: 2024-02-23 | Stop reason: SDUPTHER

## 2024-02-23 RX ORDER — SODIUM CHLORIDE 0.9 % (FLUSH) 0.9 %
5-40 SYRINGE (ML) INJECTION EVERY 12 HOURS SCHEDULED
Status: DISCONTINUED | OUTPATIENT
Start: 2024-02-23 | End: 2024-02-23 | Stop reason: HOSPADM

## 2024-02-23 RX ORDER — CEFAZOLIN 2 G/1
INJECTION, POWDER, FOR SOLUTION INTRAMUSCULAR; INTRAVENOUS
Status: COMPLETED
Start: 2024-02-23 | End: 2024-02-23

## 2024-02-23 RX ORDER — LIDOCAINE HYDROCHLORIDE 20 MG/ML
INJECTION, SOLUTION INTRAVENOUS PRN
Status: DISCONTINUED | OUTPATIENT
Start: 2024-02-23 | End: 2024-02-23 | Stop reason: SDUPTHER

## 2024-02-23 RX ORDER — HYDROMORPHONE HYDROCHLORIDE 1 MG/ML
0.25 INJECTION, SOLUTION INTRAMUSCULAR; INTRAVENOUS; SUBCUTANEOUS EVERY 5 MIN PRN
Status: COMPLETED | OUTPATIENT
Start: 2024-02-23 | End: 2024-02-23

## 2024-02-23 RX ORDER — WATER 10 ML/10ML
INJECTION INTRAMUSCULAR; INTRAVENOUS; SUBCUTANEOUS
Status: DISCONTINUED
Start: 2024-02-23 | End: 2024-02-23 | Stop reason: HOSPADM

## 2024-02-23 RX ORDER — LIDOCAINE HYDROCHLORIDE 5 MG/ML
INJECTION, SOLUTION INFILTRATION; INTRAVENOUS PRN
Status: DISCONTINUED | OUTPATIENT
Start: 2024-02-23 | End: 2024-02-23 | Stop reason: ALTCHOICE

## 2024-02-23 RX ORDER — SODIUM CHLORIDE 0.9 % (FLUSH) 0.9 %
5-40 SYRINGE (ML) INJECTION PRN
Status: DISCONTINUED | OUTPATIENT
Start: 2024-02-23 | End: 2024-02-23 | Stop reason: HOSPADM

## 2024-02-23 RX ORDER — SODIUM CHLORIDE, SODIUM LACTATE, POTASSIUM CHLORIDE, CALCIUM CHLORIDE 600; 310; 30; 20 MG/100ML; MG/100ML; MG/100ML; MG/100ML
INJECTION, SOLUTION INTRAVENOUS CONTINUOUS
Status: DISCONTINUED | OUTPATIENT
Start: 2024-02-23 | End: 2024-02-23 | Stop reason: HOSPADM

## 2024-02-23 RX ORDER — CEPHALEXIN 250 MG/1
250 CAPSULE ORAL 3 TIMES DAILY
Qty: 30 CAPSULE | Refills: 0 | Status: SHIPPED | OUTPATIENT
Start: 2024-02-23 | End: 2024-03-04

## 2024-02-23 RX ORDER — FENTANYL CITRATE 0.05 MG/ML
25 INJECTION, SOLUTION INTRAMUSCULAR; INTRAVENOUS EVERY 5 MIN PRN
Status: DISCONTINUED | OUTPATIENT
Start: 2024-02-23 | End: 2024-02-23 | Stop reason: HOSPADM

## 2024-02-23 RX ORDER — CEFAZOLIN 2 G/1
INJECTION, POWDER, FOR SOLUTION INTRAMUSCULAR; INTRAVENOUS PRN
Status: DISCONTINUED | OUTPATIENT
Start: 2024-02-23 | End: 2024-02-23 | Stop reason: SDUPTHER

## 2024-02-23 RX ORDER — FENTANYL CITRATE 50 UG/ML
INJECTION, SOLUTION INTRAMUSCULAR; INTRAVENOUS PRN
Status: DISCONTINUED | OUTPATIENT
Start: 2024-02-23 | End: 2024-02-23 | Stop reason: SDUPTHER

## 2024-02-23 RX ORDER — TRAMADOL HYDROCHLORIDE 50 MG/1
50 TABLET ORAL ONCE
Status: DISCONTINUED | OUTPATIENT
Start: 2024-02-23 | End: 2024-02-23 | Stop reason: HOSPADM

## 2024-02-23 RX ORDER — METHOCARBAMOL 100 MG/ML
INJECTION, SOLUTION INTRAMUSCULAR; INTRAVENOUS
Status: COMPLETED
Start: 2024-02-23 | End: 2024-02-23

## 2024-02-23 RX ORDER — ONDANSETRON 2 MG/ML
4 INJECTION INTRAMUSCULAR; INTRAVENOUS
Status: DISCONTINUED | OUTPATIENT
Start: 2024-02-23 | End: 2024-02-23 | Stop reason: HOSPADM

## 2024-02-23 RX ORDER — SODIUM CHLORIDE 9 MG/ML
INJECTION, SOLUTION INTRAVENOUS PRN
Status: DISCONTINUED | OUTPATIENT
Start: 2024-02-23 | End: 2024-02-23 | Stop reason: HOSPADM

## 2024-02-23 RX ORDER — TRAMADOL HYDROCHLORIDE 50 MG/1
50 TABLET ORAL 2 TIMES DAILY PRN
Qty: 28 TABLET | Refills: 0 | Status: SHIPPED | OUTPATIENT
Start: 2024-02-23 | End: 2024-03-08

## 2024-02-23 RX ADMIN — METHOCARBAMOL 1000 MG: 100 INJECTION INTRAMUSCULAR; INTRAVENOUS at 14:08

## 2024-02-23 RX ADMIN — PROPOFOL INJECTABLE EMULSION 50 MG: 10 INJECTION, EMULSION INTRAVENOUS at 12:50

## 2024-02-23 RX ADMIN — FENTANYL CITRATE 50 MCG: 50 INJECTION, SOLUTION INTRAMUSCULAR; INTRAVENOUS at 11:49

## 2024-02-23 RX ADMIN — FENTANYL CITRATE 25 MCG: 50 INJECTION, SOLUTION INTRAMUSCULAR; INTRAVENOUS at 13:36

## 2024-02-23 RX ADMIN — PROPOFOL INJECTABLE EMULSION 20 MG: 10 INJECTION, EMULSION INTRAVENOUS at 13:40

## 2024-02-23 RX ADMIN — FENTANYL CITRATE 25 MCG: 50 INJECTION, SOLUTION INTRAMUSCULAR; INTRAVENOUS at 13:31

## 2024-02-23 RX ADMIN — PROPOFOL INJECTABLE EMULSION 75 MCG/KG/MIN: 10 INJECTION, EMULSION INTRAVENOUS at 11:49

## 2024-02-23 RX ADMIN — LIDOCAINE HYDROCHLORIDE 50 MG: 20 INJECTION, SOLUTION INTRAVENOUS at 12:02

## 2024-02-23 RX ADMIN — SODIUM CHLORIDE, POTASSIUM CHLORIDE, SODIUM LACTATE AND CALCIUM CHLORIDE: 600; 310; 30; 20 INJECTION, SOLUTION INTRAVENOUS at 10:18

## 2024-02-23 RX ADMIN — HYDROMORPHONE HYDROCHLORIDE 0.25 MG: 1 INJECTION, SOLUTION INTRAMUSCULAR; INTRAVENOUS; SUBCUTANEOUS at 14:13

## 2024-02-23 RX ADMIN — HYDROMORPHONE HYDROCHLORIDE 0.25 MG: 1 INJECTION, SOLUTION INTRAMUSCULAR; INTRAVENOUS; SUBCUTANEOUS at 14:23

## 2024-02-23 RX ADMIN — FENTANYL CITRATE 50 MCG: 50 INJECTION, SOLUTION INTRAMUSCULAR; INTRAVENOUS at 12:02

## 2024-02-23 RX ADMIN — PHENYLEPHRINE HYDROCHLORIDE 100 MCG: 10 INJECTION INTRAVENOUS at 12:21

## 2024-02-23 RX ADMIN — SODIUM CHLORIDE, POTASSIUM CHLORIDE, SODIUM LACTATE AND CALCIUM CHLORIDE: 600; 310; 30; 20 INJECTION, SOLUTION INTRAVENOUS at 11:40

## 2024-02-23 RX ADMIN — PROPOFOL INJECTABLE EMULSION 20 MG: 10 INJECTION, EMULSION INTRAVENOUS at 13:45

## 2024-02-23 RX ADMIN — CEFAZOLIN 2 G: 2 INJECTION, POWDER, FOR SOLUTION INTRAMUSCULAR; INTRAVENOUS at 11:53

## 2024-02-23 RX ADMIN — LIDOCAINE HYDROCHLORIDE 50 MG: 20 INJECTION, SOLUTION INTRAVENOUS at 11:49

## 2024-02-23 ASSESSMENT — PAIN - FUNCTIONAL ASSESSMENT
PAIN_FUNCTIONAL_ASSESSMENT: 0-10

## 2024-02-23 ASSESSMENT — PAIN DESCRIPTION - DESCRIPTORS
DESCRIPTORS: SORE
DESCRIPTORS: SORE
DESCRIPTORS: ACHING
DESCRIPTORS: SORE;ACHING

## 2024-02-23 ASSESSMENT — PAIN DESCRIPTION - LOCATION
LOCATION: BACK
LOCATION: BACK

## 2024-02-23 ASSESSMENT — PAIN SCALES - GENERAL
PAINLEVEL_OUTOF10: 10
PAINLEVEL_OUTOF10: 10

## 2024-02-23 ASSESSMENT — PAIN DESCRIPTION - ORIENTATION
ORIENTATION: MID;LOWER
ORIENTATION: MID;LOWER

## 2024-02-23 NOTE — H&P
[facility]  [address], [place] [address]  [phone]    Procedure History & Physical      Traci Armstrong     HPI:    Patient  is here for low back and Left lower extremity pain for SCS implant with   [place]  Labs/imaging studies reviewed   All question and concerns addressed including R/B/A associated with the procedure    Past Medical History:   Diagnosis Date    Depression     GERD (gastroesophageal reflux disease)     Hiatal hernia     Hyperlipidemia     Hypertension     Pneumonia     Sleep apnea     has c-pap uses       Past Surgical History:   Procedure Laterality Date    BLADDER REPAIR      x3    COLONOSCOPY      ENDOSCOPY, COLON, DIAGNOSTIC      FRACTURE SURGERY      right ankle    HERNIA REPAIR      HYSTERECTOMY (CERVIX STATUS UNKNOWN)      LAMINECTOMY Right 11/21/2022    L4-L5 LAMINECTOMY AND RIGHT L4-L5 DISCECTOMY [place] [facility] [place]    OTHER SURGICAL HISTORY  11/15/12    tenovaginotomy left ring finger    PAIN MANAGEMENT PROCEDURE Left 7/10/2023    Left L2 and L3 epidural steroid injection. SEDATION performed by Klever Dc MD at [facility] OR    PAIN MANAGEMENT PROCEDURE N/A 1/15/2024    Spinal cord stimulator trial with [place] performed by Klever Dc MD at [facility] OR    SKIN BIOPSY      TONSILLECTOMY         Prior to Admission medications    Medication Sig Start Date End Date Taking? Authorizing Provider   Acetaminophen (TYLENOL ARTHRITIS PAIN PO) Take by mouth as needed    Marie Pretty MD   oxyCODONE-acetaminophen (PERCOCET) 5-325 MG per tablet TAKE 1 TABLET BY MOUTH EVERY 6 HOURS FOR 7 DAYS AS NEEDED 11/3/23   Marie Pretty MD   Calcium Polycarbophil (FIBERCON PO) Take by mouth in the morning and at bedtime    Marie Pretty MD   zinc 50 MG TABS tablet Take 1 tablet by mouth daily    Marie Pretty MD   Multiple Vitamins-Minerals (CENTRUM SILVER 50+WOMEN PO) Take by mouth daily

## 2024-02-23 NOTE — ANESTHESIA POSTPROCEDURE EVALUATION
Department of Anesthesiology  Postprocedure Note    Patient: Traci Armstrong  MRN: [id number]  YOB: 1940  Date of evaluation: 2/23/2024    Procedure Summary       Date: 02/23/24 Room / Location: [address]    Anesthesia Start: 1145 Anesthesia Stop: 1401    Procedure: Spinal cord stimulator implant with [place] (Back) Diagnosis:       Postlaminectomy syndrome      (Postlaminectomy syndrome [M96.1])    Surgeons: Klever Dc MD Responsible Provider: Garrick Hanna MD    Anesthesia Type: MAC ASA Status: 3            Anesthesia Type: No value filed.    Cris Phase I: Cris Score: 9    Cris Phase II:      Anesthesia Post Evaluation    Patient location during evaluation: PACU  Patient participation: complete - patient participated  Level of consciousness: awake and alert  Pain score: 3  Airway patency: patent  Nausea & Vomiting: no nausea  Cardiovascular status: blood pressure returned to baseline  Respiratory status: acceptable  Hydration status: euvolemic  Pain management: adequate    No notable events documented.

## 2024-02-23 NOTE — ANESTHESIA PRE PROCEDURE
mouth daily    Marie Pretty MD   sertraline (ZOLOFT) 100 MG tablet Take 0.5 tablets by mouth daily    Marie Pretty MD   Calcium Carb-Cholecalciferol (CALCIUM 600+D3 PO) Take 1 tablet by mouth 2 times daily.      Marie Pretty MD   Vitamin D (CHOLECALCIFEROL) 1000 UNITS CAPS capsule Take 2 capsules by mouth daily    Marie Pretty MD       Current medications:    Current Facility-Administered Medications   Medication Dose Route Frequency Provider Last Rate Last Admin    sodium chloride flush 0.9 % injection 5-40 mL  5-40 mL IntraVENous 2 times per day Klever Dc MD        sodium chloride flush 0.9 % injection 5-40 mL  5-40 mL IntraVENous PRN Klever Dc MD        0.9 % sodium chloride infusion   IntraVENous PRN Klever Dc MD        lactated ringers IV soln infusion   IntraVENous Continuous Edward Sumner MD 10 mL/hr at 02/23/24 1018 New Bag at 02/23/24 1018       Allergies:    Allergies   Allergen Reactions    Aspirin Anaphylaxis    Propoxyphene     Venlafaxine Hcl     Darvocet [Propoxyphene N-Acetaminophen] Nausea And Vomiting    Demerol Nausea And Vomiting       Problem List:    Patient Active Problem List   Diagnosis Code    Trigger ring finger of left hand M65.342    Lumbar stenosis M48.061    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    Lumbar radiculopathy M54.16    Lumbar facet arthropathy M47.816    Lumbar disc disorder M51.9    Spinal stenosis of lumbar region without neurogenic claudication M48.061    Lumbar spondylosis M47.816    Chronic pain syndrome G89.4    Pain in left hip M25.552    Postlaminectomy syndrome M96.1       Past Medical History:        Diagnosis Date    Depression     GERD (gastroesophageal reflux disease)     Hiatal hernia     Hyperlipidemia     Hypertension     Pneumonia     Sleep apnea     has c-pap uses       Past Surgical History:        Procedure Laterality Date    BLADDER REPAIR      x3    COLONOSCOPY      ENDOSCOPY, COLON,

## 2024-02-23 NOTE — DISCHARGE INSTRUCTIONS
Follow up in office in 1 week.  No twisting.  Pt can be up walking around.  Keep binder on.  Do not touch dressing. Leave in place.         Learning About Spinal Cord Stimulation  What is it?     Spinal cord stimulation is a treatment for chronic pain. It uses a mild electrical current. It's mostly used for low back pain, pain in the arms and legs, and pain in the trunk.  A small generator is placed in your body. It sends electrical pulses to a tiny electrode near your spinal cord. You may feel a tingle from the pulses. The pulses can help relieve pain.  Why is it done?  This treatment may be done for people with severe, chronic pain who have:  Had back surgery that didn't help their pain.  Pain from a nerve problem.  Pain that does not respond to other treatments. This includes complex regional pain syndrome.  Pain from severe peripheral vascular disease that the doctor feels cannot be treated with surgery.  If this treatment is right for you, you may have a spinal cord stimulator implanted for long-term use.  How is it done?  Spinal cord stimulation is done in two steps. Your doctor will first insert a temporary electrode through your skin. It will stay there for about a week. This first step is to see if the treatment will help your pain.  You and your doctor will test different stimulation settings and programs. Your doctor will ask you how you feel at different settings. Let your doctor know if you feel any discomfort.  You'll use a wireless remote control or other controller.  If the test works, you may get a permanent stimulator. The electrode is implanted in your spine. A lead wire runs from your spine to a small generator. It can be under the skin in your lower or upper back, buttock area, chest, or belly area.  You may get medicine that relaxes you or puts you in a light sleep. Some people may need to have general anesthesia. The areas being worked on will be numb.  After the stimulator is placed, your

## 2024-02-23 NOTE — PROGRESS NOTES
2/23//24 1600 pt c/o of pain in back rtes as a 9 on pain scale. Went to give pt a pain pill and pt sttes\" just feel weird, feel dizzy, can't see well, can see but not well like looks funny or feel weird or funny\" pt did get up with 2 assists and ambulated to bathroom without difficulty. Did agree to hold off on pain pill at this time until pt doesn't feel light headed or \"funny\" does admit vision is better since she walked to bathroom. Becka rn

## 2024-02-23 NOTE — OP NOTE
2024    Patient: Traci Armstrong  :  1940  Age:  83 y.o.  Sex:  female     PRE-OPERATIVE DIAGNOSIS: Lumbar postlaminectomy syndrome.    POST-OPERATIVE DIAGNOSIS: Same.    PROCEDURE: Fluoroscopic guided spinal cord stimulator implant.    COMPANY:[employer]    SURGEON: GABRIELLA Escobar.    ANESTHESIA: MAC    ESTIMATED BLOOD LOSS: Minimal  ______________________________________________________________________    BRIEF HISTORY: Traci Armstrong comes in today for spinal cord stimulator implant under fluoroscopic guidance. The potential complications of this procedure were discussed with her again today. She has elected to undergo the aforementioned procedure.    Traci’s complete History & Physical examination were reviewed in depth, a copy of which is in the chart.      DESCRIPTION OF PROCEDURE:    After confirming written and informed consent, a time-out was performed and Traci’s name and date of birth, the procedure to be performed as well as the plan for the location of the needle insertion were confirmed.    The patient was brought into the procedure room and placed in the prone position on the fluoroscopy table. A pillow was placed under the patient's abdomen to increase lumbar interlaminar space. Standard monitors were placed, and vital signs were observed throughout the procedure.   The area of the thoracic and lumbar spine was prepped with chloraprep and draped in a sterile manner. The x 2 octad lead entry T12-L1 interspace was identified and marked under AP fluoroscopy. The skin and subcutaneous tissues at the above level were anesthestized with 0.5% lidocaine.   An incision was marked along the spine, which would allow exposure of the needle(s). The skin incision was made with a 10 blade scalpel and then extended to the supraspiantous ligament using electrocautery.   The # 14 gauge 3 1/2 inch tuohy epidural needle was advanced with a paramedian approach with loss of resistance technique with a glass syringe and air  to identify the entrance into the epidural space. Once a good loss of resistance was obtained, negative aspiration was confirmed. The x2 octad lead was advanced under fluoroscopic guidance until the 0-position of the lead was at the Top of T7 bilaterally level. The leads were connected to an external pulse generator using sterile connectors. Several complex combinations of electrode configuration, frequency, amplitude and pulse width were used until comfortable, appropriate coverage of the patient pain area was obtained. The needle(s) were then carefully removed and the lead(s) were secured to the supraspinatus ligament with bumpy anchors and 2-0 silk sutures. Fluoroscopy was used to confirm continued proper placement of the lead(s) after being anchored.   A site was chosen for placement of the IPG unit in the Right upper buttocks below the belt line. An incision was marked. Then the skin and subcutaneous tissues in the area were anesthetized with 1% lidocaine with epinephrine. A # 10 blade scalpel was used for skin incision. The incision was extended with electrocautery about 2 cm deep into the subcutaneous tissue. Blunt dissection was used to create an appropriately sized pocket for the internal pulse generator. Hemostasis was obtained with electrocautery. At this point, the tunneling tool was used to form a tunnel from the entry point to the IPG pocket and the lead was ran through. Once in the pocket, the lead was connected to the IPG and impedence was checked one more time.  Copious irrigation was used to both incisions pockets until clear fluid was noted in the suction tubing. The IPG was internalized . Skin closure was performed using 3.0 vicryl and 2.0 Monocryl sutures . The incisions were covered with steri stripes, gauze and Tegaderm..    Disposition the patient tolerated the procedure well and there were no complications . Vital signs remained stable throughout the procedure. The patient was escorted to

## 2024-03-01 ENCOUNTER — OFFICE VISIT (OUTPATIENT)
Dept: PAIN MANAGEMENT | Age: 84
End: 2024-03-01
Payer: MEDICARE

## 2024-03-01 VITALS
TEMPERATURE: 97.1 F | RESPIRATION RATE: 18 BRPM | HEIGHT: 62 IN | WEIGHT: 135 LBS | SYSTOLIC BLOOD PRESSURE: 144 MMHG | BODY MASS INDEX: 24.84 KG/M2 | DIASTOLIC BLOOD PRESSURE: 70 MMHG | HEART RATE: 103 BPM

## 2024-03-01 DIAGNOSIS — M96.1 POSTLAMINECTOMY SYNDROME: ICD-10-CM

## 2024-03-01 DIAGNOSIS — M54.16 LUMBAR RADICULOPATHY: ICD-10-CM

## 2024-03-01 DIAGNOSIS — M47.816 LUMBAR FACET ARTHROPATHY: ICD-10-CM

## 2024-03-01 DIAGNOSIS — M79.609 PAIN IN EXTREMITY, UNSPECIFIED EXTREMITY: ICD-10-CM

## 2024-03-01 DIAGNOSIS — M48.061 SPINAL STENOSIS OF LUMBAR REGION WITHOUT NEUROGENIC CLAUDICATION: ICD-10-CM

## 2024-03-01 DIAGNOSIS — M51.9 LUMBAR DISC DISORDER: ICD-10-CM

## 2024-03-01 DIAGNOSIS — M25.552 PAIN IN LEFT HIP: ICD-10-CM

## 2024-03-01 DIAGNOSIS — M47.816 LUMBAR SPONDYLOSIS: ICD-10-CM

## 2024-03-01 DIAGNOSIS — G89.4 CHRONIC PAIN SYNDROME: Primary | ICD-10-CM

## 2024-03-01 PROCEDURE — 99213 OFFICE O/P EST LOW 20 MIN: CPT | Performed by: PAIN MEDICINE

## 2024-03-01 PROCEDURE — 1090F PRES/ABSN URINE INCON ASSESS: CPT | Performed by: PAIN MEDICINE

## 2024-03-01 PROCEDURE — 1036F TOBACCO NON-USER: CPT | Performed by: PAIN MEDICINE

## 2024-03-01 PROCEDURE — G8420 CALC BMI NORM PARAMETERS: HCPCS | Performed by: PAIN MEDICINE

## 2024-03-01 PROCEDURE — G8427 DOCREV CUR MEDS BY ELIG CLIN: HCPCS | Performed by: PAIN MEDICINE

## 2024-03-01 PROCEDURE — G8400 PT W/DXA NO RESULTS DOC: HCPCS | Performed by: PAIN MEDICINE

## 2024-03-01 PROCEDURE — G8484 FLU IMMUNIZE NO ADMIN: HCPCS | Performed by: PAIN MEDICINE

## 2024-03-01 PROCEDURE — 1123F ACP DISCUSS/DSCN MKR DOCD: CPT | Performed by: PAIN MEDICINE

## 2024-03-01 NOTE — PROGRESS NOTES
Traci Armstrong presents to the [facility] on 3/1/2024. Traci is complaining of pain S/P SCS and  BLE The pain is constant. The pain is described as aching, tender, burning, and incisional . Pain is rated on her best day at a 6, on her worst day at a 10, and on average at a 8 on the VAS scale. She took her last dose of Percocet a few days ago.      Any procedures since your last visit: Yes, Spinal cord stimulator implant with [place] with 50 % relief.    She is not on NSAIDS and  is not on anticoagulation medications to include none   Pacemaker or defibrillator: No  Medication Contract and Consent for Opioid Use Documents Filed        No documents found                       There were no vitals taken for this visit.     No LMP recorded. Patient has had a hysterectomy.  
education: Not on file    Highest education level: Not on file   Occupational History    Not on file   Tobacco Use    Smoking status: Never    Smokeless tobacco: Never   Vaping Use    Vaping Use: Never used   Substance and Sexual Activity    Alcohol use: No    Drug use: Never    Sexual activity: Not on file   Other Topics Concern    Not on file   Social History Narrative    Not on file     Social Determinants of Health     Financial Resource Strain: Not on file   Food Insecurity: Not on file   Transportation Needs: Not on file   Physical Activity: Not on file   Stress: Not on file   Social Connections: Not on file   Intimate Partner Violence: Not on file   Housing Stability: Not on file     No family history on file.    REVIEW OF SYSTEMS:     Traci denies fever/chills, chest pain, shortness of breath, new bowel or bladder complaints. All other review of systems was negative.    PHYSICAL EXAMINATION:      BP (!) 144/70   Pulse (!) 103   Temp 97.1 °F (36.2 °C) (Infrared)   Resp 18   Ht 1.575 m (5' 2\")   Wt 61.2 kg (135 lb)   BMI 24.69 kg/m²     General:       General appearance:   elderly, pleasant, and well-hydrated.   , in mild discomfort and A & O x3  Build:Normal Weight     HEENT:     Head:normocephalic and atraumatic  Sclera: icterus absent,      Lungs:     Breathing:Breathing Pattern: regular, no distress     Abdomen:     Shape:non-distended and normal  Tenderness:none     Lumbar spine:     Spine inspection:surgical incision scar   CVA tenderness:No   Palpation:tenderness paravertebral muscles  Range of motion:not tested  Lead and IPG incisions are well healed with no signs of infection or discharge.    Musculoskeletal:     Trigger points in [place]:absent bilaterally    Extremities:     Tremors:None bilaterally upper and lower  Intact:Yes  Edema:Normal     Neurological:     Sensory:diminished to light touch Left anterior thigh.  Motor:                          Right Quadriceps5-/5          Left

## 2024-03-08 ENCOUNTER — OFFICE VISIT (OUTPATIENT)
Dept: PAIN MANAGEMENT | Age: 84
End: 2024-03-08
Payer: MEDICARE

## 2024-03-08 VITALS
WEIGHT: 135 LBS | HEIGHT: 62 IN | DIASTOLIC BLOOD PRESSURE: 60 MMHG | RESPIRATION RATE: 18 BRPM | TEMPERATURE: 97.5 F | BODY MASS INDEX: 24.84 KG/M2 | HEART RATE: 99 BPM | OXYGEN SATURATION: 95 % | SYSTOLIC BLOOD PRESSURE: 120 MMHG

## 2024-03-08 DIAGNOSIS — M54.16 LUMBAR RADICULOPATHY: ICD-10-CM

## 2024-03-08 DIAGNOSIS — M47.816 LUMBAR SPONDYLOSIS: ICD-10-CM

## 2024-03-08 DIAGNOSIS — M47.816 LUMBAR FACET ARTHROPATHY: ICD-10-CM

## 2024-03-08 DIAGNOSIS — M48.061 SPINAL STENOSIS OF LUMBAR REGION WITHOUT NEUROGENIC CLAUDICATION: ICD-10-CM

## 2024-03-08 DIAGNOSIS — G89.4 CHRONIC PAIN SYNDROME: Primary | ICD-10-CM

## 2024-03-08 DIAGNOSIS — M79.609 PAIN IN EXTREMITY, UNSPECIFIED EXTREMITY: ICD-10-CM

## 2024-03-08 DIAGNOSIS — M96.1 POSTLAMINECTOMY SYNDROME: ICD-10-CM

## 2024-03-08 DIAGNOSIS — M51.9 LUMBAR DISC DISORDER: ICD-10-CM

## 2024-03-08 DIAGNOSIS — M25.552 PAIN IN LEFT HIP: ICD-10-CM

## 2024-03-08 PROCEDURE — G8427 DOCREV CUR MEDS BY ELIG CLIN: HCPCS | Performed by: PAIN MEDICINE

## 2024-03-08 PROCEDURE — 1090F PRES/ABSN URINE INCON ASSESS: CPT | Performed by: PAIN MEDICINE

## 2024-03-08 PROCEDURE — G8420 CALC BMI NORM PARAMETERS: HCPCS | Performed by: PAIN MEDICINE

## 2024-03-08 PROCEDURE — G8400 PT W/DXA NO RESULTS DOC: HCPCS | Performed by: PAIN MEDICINE

## 2024-03-08 PROCEDURE — 99213 OFFICE O/P EST LOW 20 MIN: CPT | Performed by: PAIN MEDICINE

## 2024-03-08 PROCEDURE — 1123F ACP DISCUSS/DSCN MKR DOCD: CPT | Performed by: PAIN MEDICINE

## 2024-03-08 PROCEDURE — 1036F TOBACCO NON-USER: CPT | Performed by: PAIN MEDICINE

## 2024-03-08 PROCEDURE — G8484 FLU IMMUNIZE NO ADMIN: HCPCS | Performed by: PAIN MEDICINE

## 2024-03-08 NOTE — PROGRESS NOTES
Traci Armstrong presents to the [facility] on 3/8/2024. Traci is complaining of pain S/P 2 weeks SCS . The pain is intermittent. The pain is described as aching, throbbing, and penetrating. Pain is rated on her best day at a 0, on her worst day at a 8, and on average at a 4 on the VAS scale. She took her last dose of Percocet and Tylenol @ [facility].      Any procedures since your last visit: No, 2 weeks post op SCS  She is not on NSAIDS and  is not on anticoagulation medications to include none  Pacemaker or defibrillator: No  Medication Contract and Consent for Opioid Use Documents Filed        No documents found                       Resp 18   Ht 1.575 m (5' 2\")   Wt 61.2 kg (135 lb)   BMI 24.69 kg/m²      No LMP recorded. Patient has had a hysterectomy.

## 2024-03-08 NOTE — PROGRESS NOTES
[facility]  [address], [place] [address]  [phone]    Follow up Note      Traci Mosess     Date of Visit:  3/8/2024    CC:  Patient presents for follow up   Chief Complaint   Patient presents with    Follow-up     SCS implant     HPI:  Follow up on her low back pain with no acute issues.  Appropriate analgesia with current medications regimen:No.    Change in quality of symptoms:no.    Medication side effects:none  Recent diagnostic testing:none.   Recent interventional procedures:SCS implant POW#2    She has not been on anticoagulation medications to include none. The patient  has not been on herbal supplements.  The patient is not diabetic.     Imaging:   Lumbar spine MRI 2023   Degenerative changes and scoliosis result in moderate spinal stenosis and  mild mass effect on the cauda equina at L2-3, and milder spinal stenosis at  other levels as described above.     Moderate left neural foraminal stenosis at L3-4 secondary to degenerative  changes and scoliosis.  Milder neural foraminal stenosis at other levels as  described above.     Roughly 2 cm x 6 cm x 4 cm rim enhancing fluid collection in the laminectomy  defect at L4-5 is suggestive of seroma, however abscess is not excluded.  No  epidural abscess.    EMG Left lower extremity:  Electrodiagnosis: There is electrodiagnostic evidence of a lumbar radiculopathy.    Location: left L4.    Nature: [ X ] Axonal [ ] Demyelinating [ ] Mixed axonal and demyelinating     [ ] Sensory [ X ] Motor [ ] Mixed sensorimotor   [X ] with active denervation [ ] without active denervation    Duration: Acute    Severity: mild    Prognosis: Poor.     Previous Study: There is not a prior study for comparison.     Left hip Xray Normal.     Previous treatments: Physical Therapy, Epidural Steroid Injection, Surgery L4-L5 laminectomy, and medications..        Potential Aberrant Drug-Related Behavior:    No    Urine Drug

## 2024-03-15 ENCOUNTER — OFFICE VISIT (OUTPATIENT)
Dept: PAIN MANAGEMENT | Age: 84
End: 2024-03-15
Payer: MEDICARE

## 2024-03-15 VITALS
RESPIRATION RATE: 18 BRPM | HEIGHT: 62 IN | DIASTOLIC BLOOD PRESSURE: 54 MMHG | WEIGHT: 135 LBS | BODY MASS INDEX: 24.84 KG/M2 | SYSTOLIC BLOOD PRESSURE: 120 MMHG | TEMPERATURE: 95.9 F | OXYGEN SATURATION: 95 % | HEART RATE: 83 BPM

## 2024-03-15 DIAGNOSIS — M79.609 PAIN IN EXTREMITY, UNSPECIFIED EXTREMITY: ICD-10-CM

## 2024-03-15 DIAGNOSIS — G89.4 CHRONIC PAIN SYNDROME: ICD-10-CM

## 2024-03-15 DIAGNOSIS — M54.16 LUMBAR RADICULOPATHY: ICD-10-CM

## 2024-03-15 DIAGNOSIS — M48.061 SPINAL STENOSIS OF LUMBAR REGION WITHOUT NEUROGENIC CLAUDICATION: ICD-10-CM

## 2024-03-15 DIAGNOSIS — M25.552 PAIN IN LEFT HIP: ICD-10-CM

## 2024-03-15 DIAGNOSIS — M51.9 LUMBAR DISC DISORDER: ICD-10-CM

## 2024-03-15 DIAGNOSIS — M47.816 LUMBAR FACET ARTHROPATHY: ICD-10-CM

## 2024-03-15 DIAGNOSIS — M96.1 POSTLAMINECTOMY SYNDROME: Primary | ICD-10-CM

## 2024-03-15 DIAGNOSIS — M47.816 LUMBAR SPONDYLOSIS: ICD-10-CM

## 2024-03-15 DIAGNOSIS — Z00.8 ENCOUNTER FOR PSYCHOLOGICAL EVALUATION: ICD-10-CM

## 2024-03-15 PROCEDURE — G8484 FLU IMMUNIZE NO ADMIN: HCPCS | Performed by: PAIN MEDICINE

## 2024-03-15 PROCEDURE — 99213 OFFICE O/P EST LOW 20 MIN: CPT | Performed by: PAIN MEDICINE

## 2024-03-15 PROCEDURE — 1123F ACP DISCUSS/DSCN MKR DOCD: CPT | Performed by: PAIN MEDICINE

## 2024-03-15 PROCEDURE — G8427 DOCREV CUR MEDS BY ELIG CLIN: HCPCS | Performed by: PAIN MEDICINE

## 2024-03-15 PROCEDURE — 1090F PRES/ABSN URINE INCON ASSESS: CPT | Performed by: PAIN MEDICINE

## 2024-03-15 PROCEDURE — G8420 CALC BMI NORM PARAMETERS: HCPCS | Performed by: PAIN MEDICINE

## 2024-03-15 PROCEDURE — 1036F TOBACCO NON-USER: CPT | Performed by: PAIN MEDICINE

## 2024-03-15 PROCEDURE — G8400 PT W/DXA NO RESULTS DOC: HCPCS | Performed by: PAIN MEDICINE

## 2024-03-15 RX ORDER — PREDNISONE 20 MG/1
20 TABLET ORAL DAILY
COMMUNITY
Start: 2024-03-11

## 2024-03-15 RX ORDER — GABAPENTIN 100 MG/1
100 CAPSULE ORAL 3 TIMES DAILY
COMMUNITY
Start: 2024-03-08

## 2024-03-15 NOTE — PROGRESS NOTES
[facility]  [address], [place] [address]  [phone]    Follow up Note      Traci Armstrong     Date of Visit:  3/15/2024    CC:  Patient presents for follow up   Chief Complaint   Patient presents with    Follow-up     3 weeks S/P SCS     HPI:  Follow up on her low back pain with no acute issues.  Appropriate analgesia with current medications regimen:yes  Change in quality of symptoms:no.    Medication side effects:none  Recent diagnostic testing:none.   Recent interventional procedures:SCS implant POW#3    She has not been on anticoagulation medications to include none. The patient  has not been on herbal supplements.  The patient is not diabetic.     Imaging:   Lumbar spine MRI 2023   Degenerative changes and scoliosis result in moderate spinal stenosis and  mild mass effect on the cauda equina at L2-3, and milder spinal stenosis at  other levels as described above.     Moderate left neural foraminal stenosis at L3-4 secondary to degenerative  changes and scoliosis.  Milder neural foraminal stenosis at other levels as  described above.     Roughly 2 cm x 6 cm x 4 cm rim enhancing fluid collection in the laminectomy  defect at L4-5 is suggestive of seroma, however abscess is not excluded.  No  epidural abscess.    EMG Left lower extremity:  Electrodiagnosis: There is electrodiagnostic evidence of a lumbar radiculopathy.    Location: left L4.    Nature: [ X ] Axonal [ ] Demyelinating [ ] Mixed axonal and demyelinating     [ ] Sensory [ X ] Motor [ ] Mixed sensorimotor   [X ] with active denervation [ ] without active denervation    Duration: Acute    Severity: mild    Prognosis: Poor.     Previous Study: There is not a prior study for comparison.     Left hip Xray Normal.     Previous treatments: Physical Therapy, Epidural Steroid Injection, Surgery L4-L5 laminectomy, and medications..        Potential Aberrant Drug-Related Behavior:    No    Urine Drug

## 2024-03-15 NOTE — PROGRESS NOTES
Traci Armstrong presents to the [facility] on 3/15/2024. Traci is complaining of pain post op 3 weeks SCS. The pain is intermittent. The pain is described as aching, penetrating, and nagging. Pain is rated on her best day at a 5, on her worst day at a 9, and on average at a 5 on the VAS scale. She took her last dose of Percocet @ [facility].      Any procedures since your last visit: No    She is not on NSAIDS and  is not on anticoagulation medications to include none Pacemaker or defibrillator: No   Medication Contract and Consent for Opioid Use Documents Filed        No documents found                       Resp 18   Ht 1.575 m (5' 2\")   Wt 61.2 kg (135 lb)   BMI 24.69 kg/m²      No LMP recorded. Patient has had a hysterectomy.

## 2024-03-21 ENCOUNTER — OFFICE VISIT (OUTPATIENT)
Dept: PAIN MANAGEMENT | Age: 84
End: 2024-03-21
Payer: MEDICARE

## 2024-03-21 VITALS
RESPIRATION RATE: 18 BRPM | HEIGHT: 62 IN | SYSTOLIC BLOOD PRESSURE: 118 MMHG | BODY MASS INDEX: 24.84 KG/M2 | WEIGHT: 135 LBS | TEMPERATURE: 96.9 F | HEART RATE: 83 BPM | DIASTOLIC BLOOD PRESSURE: 84 MMHG | OXYGEN SATURATION: 98 %

## 2024-03-21 DIAGNOSIS — M54.16 LUMBAR RADICULOPATHY: ICD-10-CM

## 2024-03-21 DIAGNOSIS — M79.609 PAIN IN EXTREMITY, UNSPECIFIED EXTREMITY: ICD-10-CM

## 2024-03-21 DIAGNOSIS — M47.816 LUMBAR SPONDYLOSIS: ICD-10-CM

## 2024-03-21 DIAGNOSIS — M48.061 SPINAL STENOSIS OF LUMBAR REGION WITHOUT NEUROGENIC CLAUDICATION: ICD-10-CM

## 2024-03-21 DIAGNOSIS — M47.816 LUMBAR FACET ARTHROPATHY: ICD-10-CM

## 2024-03-21 DIAGNOSIS — G89.4 CHRONIC PAIN SYNDROME: Primary | ICD-10-CM

## 2024-03-21 DIAGNOSIS — M25.552 PAIN IN LEFT HIP: ICD-10-CM

## 2024-03-21 DIAGNOSIS — M51.9 LUMBAR DISC DISORDER: ICD-10-CM

## 2024-03-21 DIAGNOSIS — M96.1 POSTLAMINECTOMY SYNDROME: ICD-10-CM

## 2024-03-21 PROCEDURE — 1090F PRES/ABSN URINE INCON ASSESS: CPT | Performed by: PAIN MEDICINE

## 2024-03-21 PROCEDURE — 99213 OFFICE O/P EST LOW 20 MIN: CPT | Performed by: PAIN MEDICINE

## 2024-03-21 PROCEDURE — G8427 DOCREV CUR MEDS BY ELIG CLIN: HCPCS | Performed by: PAIN MEDICINE

## 2024-03-21 PROCEDURE — 1123F ACP DISCUSS/DSCN MKR DOCD: CPT | Performed by: PAIN MEDICINE

## 2024-03-21 PROCEDURE — G8420 CALC BMI NORM PARAMETERS: HCPCS | Performed by: PAIN MEDICINE

## 2024-03-21 PROCEDURE — G8400 PT W/DXA NO RESULTS DOC: HCPCS | Performed by: PAIN MEDICINE

## 2024-03-21 PROCEDURE — G8484 FLU IMMUNIZE NO ADMIN: HCPCS | Performed by: PAIN MEDICINE

## 2024-03-21 PROCEDURE — 1036F TOBACCO NON-USER: CPT | Performed by: PAIN MEDICINE

## 2024-03-21 NOTE — PROGRESS NOTES
Traci Armstrong presents to the [facility] on 3/21/2024. Traci is complaining of pain in her lower back that radiates to LLE. The pain is intermittent. The pain is described as aching and penetrating. Pain is rated on her best day at a 0, on her worst day at a 10, and on average at a 8 on the VAS scale. She took her last dose of Percocet 3 nights ago.      Any procedures since your last visit: No    She is not on NSAIDS and  is not on anticoagulation medications to include none and is managed by NA.     Pacemaker or defibrillator: No     Medication Contract and Consent for Opioid Use Documents Filed        No documents found                       Resp 18   Ht 1.575 m (5' 2.01\")   Wt 61.2 kg (135 lb)   BMI 24.69 kg/m²      No LMP recorded. Patient has had a hysterectomy.  
History    Not on file   Tobacco Use    Smoking status: Never    Smokeless tobacco: Never   Vaping Use    Vaping Use: Never used   Substance and Sexual Activity    Alcohol use: No    Drug use: Never    Sexual activity: Not on file   Other Topics Concern    Not on file   Social History Narrative    Not on file     Social Determinants of Health     Financial Resource Strain: Not on file   Food Insecurity: Not on file   Transportation Needs: Not on file   Physical Activity: Not on file   Stress: Not on file   Social Connections: Not on file   Intimate Partner Violence: Not on file   Housing Stability: Not on file     No family history on file.    REVIEW OF SYSTEMS:     Traci denies fever/chills, chest pain, shortness of breath, new bowel or bladder complaints. All other review of systems was negative.    PHYSICAL EXAMINATION:      /84   Pulse 83   Temp 96.9 °F (36.1 °C) (Infrared)   Resp 18   Ht 1.575 m (5' 2.01\")   Wt 61.2 kg (135 lb)   SpO2 98%   BMI 24.69 kg/m²     General:       General appearance:   elderly, pleasant, and well-hydrated.   , in mild discomfort and A & O x3  Build:Normal Weight     HEENT:     Head:normocephalic and atraumatic  Sclera: icterus absent,      Lungs:     Breathing:Breathing Pattern: regular, no distress     Abdomen:     Shape:non-distended and normal  Tenderness:none     Lumbar spine:     Spine inspection:surgical incision scar   CVA tenderness:No   Palpation:tenderness paravertebral muscles  Range of motion:not tested  Lead and IPG incisions are well healed with no signs of infection or discharge.    Musculoskeletal:     Trigger points in Paraveteral:absent bilaterally    Extremities:     Tremors:None bilaterally upper and lower  Intact:Yes  Edema:Normal     Neurological:     Sensory:diminished to light touch Left anterior thigh.  Motor:                          Right Quadriceps5-/5          Left Quadriceps5-/5           Right Gastrocnemius5-/5    Left

## 2024-08-20 ENCOUNTER — OFFICE VISIT (OUTPATIENT)
Dept: PAIN MANAGEMENT | Age: 84
End: 2024-08-20
Payer: MEDICARE

## 2024-08-20 VITALS
BODY MASS INDEX: 24.84 KG/M2 | HEIGHT: 62 IN | RESPIRATION RATE: 18 BRPM | OXYGEN SATURATION: 94 % | SYSTOLIC BLOOD PRESSURE: 132 MMHG | WEIGHT: 135 LBS | DIASTOLIC BLOOD PRESSURE: 70 MMHG | HEART RATE: 102 BPM | TEMPERATURE: 96.6 F

## 2024-08-20 DIAGNOSIS — M47.816 LUMBAR SPONDYLOSIS: ICD-10-CM

## 2024-08-20 DIAGNOSIS — M96.1 POSTLAMINECTOMY SYNDROME: ICD-10-CM

## 2024-08-20 DIAGNOSIS — G89.4 CHRONIC PAIN SYNDROME: Primary | ICD-10-CM

## 2024-08-20 DIAGNOSIS — M48.061 SPINAL STENOSIS OF LUMBAR REGION WITHOUT NEUROGENIC CLAUDICATION: ICD-10-CM

## 2024-08-20 DIAGNOSIS — M47.816 LUMBAR FACET ARTHROPATHY: ICD-10-CM

## 2024-08-20 DIAGNOSIS — T85.192A MALFUNCTION OF SPINAL CORD STIMULATOR, INITIAL ENCOUNTER (HCC): ICD-10-CM

## 2024-08-20 DIAGNOSIS — M25.552 PAIN IN LEFT HIP: ICD-10-CM

## 2024-08-20 DIAGNOSIS — M79.609 PAIN IN EXTREMITY, UNSPECIFIED EXTREMITY: ICD-10-CM

## 2024-08-20 DIAGNOSIS — M51.9 LUMBAR DISC DISORDER: ICD-10-CM

## 2024-08-20 DIAGNOSIS — M54.16 LUMBAR RADICULOPATHY: ICD-10-CM

## 2024-08-20 PROCEDURE — G8420 CALC BMI NORM PARAMETERS: HCPCS | Performed by: PAIN MEDICINE

## 2024-08-20 PROCEDURE — 99213 OFFICE O/P EST LOW 20 MIN: CPT | Performed by: PAIN MEDICINE

## 2024-08-20 PROCEDURE — 1036F TOBACCO NON-USER: CPT | Performed by: PAIN MEDICINE

## 2024-08-20 PROCEDURE — 1090F PRES/ABSN URINE INCON ASSESS: CPT | Performed by: PAIN MEDICINE

## 2024-08-20 PROCEDURE — G8427 DOCREV CUR MEDS BY ELIG CLIN: HCPCS | Performed by: PAIN MEDICINE

## 2024-08-20 PROCEDURE — 1123F ACP DISCUSS/DSCN MKR DOCD: CPT | Performed by: PAIN MEDICINE

## 2024-08-20 PROCEDURE — 99214 OFFICE O/P EST MOD 30 MIN: CPT | Performed by: PAIN MEDICINE

## 2024-08-20 PROCEDURE — G8400 PT W/DXA NO RESULTS DOC: HCPCS | Performed by: PAIN MEDICINE

## 2024-08-20 RX ORDER — CETIRIZINE HYDROCHLORIDE 10 MG/1
TABLET ORAL
COMMUNITY
Start: 2024-08-03

## 2024-08-20 RX ORDER — PREDNISONE 10 MG/1
TABLET ORAL
COMMUNITY
Start: 2024-07-23

## 2024-08-20 RX ORDER — ROSUVASTATIN CALCIUM 40 MG/1
40 TABLET, COATED ORAL DAILY
COMMUNITY
Start: 2024-06-11

## 2024-08-20 RX ORDER — HYDROXYZINE HYDROCHLORIDE 25 MG/1
TABLET, FILM COATED ORAL
COMMUNITY
Start: 2024-08-03

## 2024-08-20 NOTE — PROGRESS NOTES
[facility]  [address], [place] [address]  [phone]    Follow up Note      Traci JUAN CARLOS Patti     Date of Visit:  8/20/2024    CC:  Patient presents for follow up   Chief Complaint   Patient presents with    Back Pain     HPI:  Worsening low back pain with radiation to the Left lower extremity s/p fall.  Pain is described as sharp/constant and is aggravated by movement.  Appropriate analgesia with current medications regimen:yes  Change in quality of symptoms:no.    Medication side effects:none  Recent diagnostic testing:none.   Recent interventional procedures:none    She has not been on anticoagulation medications to include none. The patient  has not been on herbal supplements.  The patient is not diabetic.     Imaging:   Lumbar spine MRI 2023   Degenerative changes and scoliosis result in moderate spinal stenosis and  mild mass effect on the cauda equina at L2-3, and milder spinal stenosis at  other levels as described above.     Moderate left neural foraminal stenosis at L3-4 secondary to degenerative  changes and scoliosis.  Milder neural foraminal stenosis at other levels as  described above.     Roughly 2 cm x 6 cm x 4 cm rim enhancing fluid collection in the laminectomy  defect at L4-5 is suggestive of seroma, however abscess is not excluded.  No  epidural abscess.    EMG Left lower extremity:  Electrodiagnosis: There is electrodiagnostic evidence of a lumbar radiculopathy.    Location: left L4.    Nature: [ X ] Axonal [ ] Demyelinating [ ] Mixed axonal and demyelinating     [ ] Sensory [ X ] Motor [ ] Mixed sensorimotor   [X ] with active denervation [ ] without active denervation    Duration: Acute    Severity: mild    Prognosis: Poor.     Previous Study: There is not a prior study for comparison.     Left hip Xray Normal.     Previous treatments: Physical Therapy, Epidural Steroid Injection, Surgery L4-L5 laminectomy, and medications..        Potential

## 2024-08-20 NOTE — PROGRESS NOTES
Traci Armstrong presents to the [facility] on 8/20/2024. Traci is complaining of pain in her lower back radiating to left groin and LLE (SCS implant). The pain is intermittent. The pain is described as deep aching. Pain is rated on her best day at a 2, on her worst day at a 10, and on average at a 10 on the VAS scale. She took her last dose of Percocet and tylenol yesterday.      Any procedures since your last visit: No    She is not on NSAIDS and  is not on anticoagulation medications to include none and is managed by NA.     Pacemaker or defibrillator: No     Medication Contract and Consent for Opioid Use Documents Filed        No documents found                       Resp 18   Ht 1.575 m (5' 2.01\")   Wt 61.2 kg (135 lb)   BMI 24.69 kg/m²      No LMP recorded. Patient has had a hysterectomy.

## 2024-08-27 ENCOUNTER — OFFICE VISIT (OUTPATIENT)
Dept: PAIN MANAGEMENT | Age: 84
End: 2024-08-27
Payer: MEDICARE

## 2024-08-27 VITALS
HEART RATE: 80 BPM | SYSTOLIC BLOOD PRESSURE: 110 MMHG | WEIGHT: 135 LBS | TEMPERATURE: 97.5 F | BODY MASS INDEX: 24.84 KG/M2 | RESPIRATION RATE: 18 BRPM | OXYGEN SATURATION: 97 % | DIASTOLIC BLOOD PRESSURE: 70 MMHG | HEIGHT: 62 IN

## 2024-08-27 DIAGNOSIS — T85.192A MALFUNCTION OF SPINAL CORD STIMULATOR, INITIAL ENCOUNTER (HCC): ICD-10-CM

## 2024-08-27 DIAGNOSIS — M25.552 PAIN IN LEFT HIP: ICD-10-CM

## 2024-08-27 DIAGNOSIS — M47.816 LUMBAR FACET ARTHROPATHY: ICD-10-CM

## 2024-08-27 DIAGNOSIS — M54.16 LUMBAR RADICULOPATHY: ICD-10-CM

## 2024-08-27 DIAGNOSIS — M79.609 PAIN IN EXTREMITY, UNSPECIFIED EXTREMITY: ICD-10-CM

## 2024-08-27 DIAGNOSIS — M96.1 POSTLAMINECTOMY SYNDROME: ICD-10-CM

## 2024-08-27 DIAGNOSIS — G89.4 CHRONIC PAIN SYNDROME: Primary | ICD-10-CM

## 2024-08-27 DIAGNOSIS — M48.061 SPINAL STENOSIS OF LUMBAR REGION WITHOUT NEUROGENIC CLAUDICATION: ICD-10-CM

## 2024-08-27 DIAGNOSIS — M51.9 LUMBAR DISC DISORDER: ICD-10-CM

## 2024-08-27 DIAGNOSIS — M47.816 LUMBAR SPONDYLOSIS: ICD-10-CM

## 2024-08-27 PROCEDURE — G8400 PT W/DXA NO RESULTS DOC: HCPCS | Performed by: PAIN MEDICINE

## 2024-08-27 PROCEDURE — 99213 OFFICE O/P EST LOW 20 MIN: CPT | Performed by: PAIN MEDICINE

## 2024-08-27 PROCEDURE — G8427 DOCREV CUR MEDS BY ELIG CLIN: HCPCS | Performed by: PAIN MEDICINE

## 2024-08-27 PROCEDURE — 1090F PRES/ABSN URINE INCON ASSESS: CPT | Performed by: PAIN MEDICINE

## 2024-08-27 PROCEDURE — 99214 OFFICE O/P EST MOD 30 MIN: CPT | Performed by: PAIN MEDICINE

## 2024-08-27 PROCEDURE — 1036F TOBACCO NON-USER: CPT | Performed by: PAIN MEDICINE

## 2024-08-27 PROCEDURE — 1123F ACP DISCUSS/DSCN MKR DOCD: CPT | Performed by: PAIN MEDICINE

## 2024-08-27 PROCEDURE — G8420 CALC BMI NORM PARAMETERS: HCPCS | Performed by: PAIN MEDICINE

## 2024-08-27 RX ORDER — METHYLPREDNISOLONE 4 MG
TABLET, DOSE PACK ORAL
Qty: 1 KIT | Refills: 0 | Status: SHIPPED | OUTPATIENT
Start: 2024-08-27 | End: 2024-09-02

## 2024-08-27 NOTE — PROGRESS NOTES
Traci Armstrong presents to the [facility] on 8/27/2024. Traci is complaining of pain in her lower back radiating to left groin and LLE. The pain is intermittent. The pain is described as aching. Pain is rated on her best day at a 2, on her worst day at a 10, and on average at a 10 on the VAS scale. She took her last dose of Percocet and Tylenol yesterday.      Any procedures since your last visit: No    She is not on NSAIDS and  is not on anticoagulation medications to include none and is managed by NA.     Pacemaker or defibrillator: No     Medication Contract and Consent for Opioid Use Documents Filed        No documents found                       Resp 18   Ht 1.575 m (5' 2.01\")   Wt 61.2 kg (135 lb)   BMI 24.69 kg/m²      No LMP recorded. Patient has had a hysterectomy.

## 2024-08-27 NOTE — PROGRESS NOTES
[facility]  [address], [place] [address]  [phone]    Follow up Note      Traci JUAN CARLOS Patti     Date of Visit:  8/27/2024    CC:  Patient presents for follow up   Chief Complaint   Patient presents with    Follow-up     Xrays     HPI:  Follow up on her low back pain with radiation to the Left lower extremity with no acute issues.  Appropriate analgesia with current medications regimen:yes  Change in quality of symptoms:no.    Medication side effects:none  Recent diagnostic testing:Thoracic and lumbar spine Xray  Recent interventional procedures:none    She has not been on anticoagulation medications to include none. The patient  has not been on herbal supplements.  The patient is not diabetic.     Imaging:   Lumbar spine MRI 2023   Degenerative changes and scoliosis result in moderate spinal stenosis and  mild mass effect on the cauda equina at L2-3, and milder spinal stenosis at  other levels as described above.     Moderate left neural foraminal stenosis at L3-4 secondary to degenerative  changes and scoliosis.  Milder neural foraminal stenosis at other levels as  described above.     Roughly 2 cm x 6 cm x 4 cm rim enhancing fluid collection in the laminectomy  defect at L4-5 is suggestive of seroma, however abscess is not excluded.  No  epidural abscess.    EMG Left lower extremity:  Electrodiagnosis: There is electrodiagnostic evidence of a lumbar radiculopathy.    Location: left L4.    Nature: [ X ] Axonal [ ] Demyelinating [ ] Mixed axonal and demyelinating     [ ] Sensory [ X ] Motor [ ] Mixed sensorimotor   [X ] with active denervation [ ] without active denervation    Duration: Acute    Severity: mild    Prognosis: Poor.     Previous Study: There is not a prior study for comparison.     Left hip Xray Normal.     Previous treatments: Physical Therapy, Epidural Steroid Injection, Surgery L4-L5 laminectomy, and medications..        Potential Aberrant Drug-Related  Venlafaxine Hcl     Darvocet [Propoxyphene N-Acetaminophen] Nausea And Vomiting    Demerol Nausea And Vomiting     Social History     Socioeconomic History    Marital status:      Spouse name: Not on file    Number of children: Not on file    Years of education: Not on file    Highest education level: Not on file   Occupational History    Not on file   Tobacco Use    Smoking status: Never    Smokeless tobacco: Never   Vaping Use    Vaping status: Never Used   Substance and Sexual Activity    Alcohol use: No    Drug use: Never    Sexual activity: Not on file   Other Topics Concern    Not on file   Social History Narrative    Not on file     Social Determinants of Health     Financial Resource Strain: Not on file   Food Insecurity: Not on file   Transportation Needs: Not on file   Physical Activity: Not on file   Stress: Not on file   Social Connections: Not on file   Intimate Partner Violence: Not on file   Housing Stability: Not on file     History reviewed. No pertinent family history.    REVIEW OF SYSTEMS:     Traci denies fever/chills, chest pain, shortness of breath, new bowel or bladder complaints. All other review of systems was negative.    PHYSICAL EXAMINATION:      /70   Pulse 80   Temp 97.5 °F (36.4 °C) (Infrared)   Resp 18   Ht 1.575 m (5' 2.01\")   Wt 61.2 kg (135 lb)   SpO2 97%   BMI 24.69 kg/m²     General:       General appearance:   elderly, pleasant, and well-hydrated.   , in moderate discomfort and A & O x3  Build:Normal Weight     HEENT:     Head:normocephalic and atraumatic  Sclera: icterus absent,      Lungs:     Breathing:Breathing Pattern: regular, no distress     Abdomen:     Shape:non-distended and normal  Tenderness:none     Lumbar spine:     Spine inspection:surgical incision scar   CVA tenderness:No   Palpation:tenderness paravertebral muscles, facet tenderness bilaterally  Range of motion:not tested     Musculoskeletal:     Trigger points in Paraveteral:absent

## 2024-11-30 ENCOUNTER — HOSPITAL ENCOUNTER (EMERGENCY)
Age: 84
Discharge: HOME | End: 2024-11-30
Payer: MEDICARE

## 2024-11-30 ENCOUNTER — APPOINTMENT (OUTPATIENT)
Dept: GENERAL RADIOLOGY | Age: 84
End: 2024-11-30
Payer: MEDICARE

## 2024-11-30 VITALS
OXYGEN SATURATION: 99 % | SYSTOLIC BLOOD PRESSURE: 146 MMHG | WEIGHT: 135 LBS | RESPIRATION RATE: 18 BRPM | HEIGHT: 63 IN | TEMPERATURE: 98.6 F | HEART RATE: 86 BPM | BODY MASS INDEX: 23.92 KG/M2 | DIASTOLIC BLOOD PRESSURE: 80 MMHG

## 2024-11-30 DIAGNOSIS — J06.9 URI WITH COUGH AND CONGESTION: Primary | ICD-10-CM

## 2024-11-30 LAB
FLUAV RNA RESP QL NAA+PROBE: NOT DETECTED
FLUBV RNA RESP QL NAA+PROBE: NOT DETECTED
SARS-COV-2 RNA RESP QL NAA+PROBE: NOT DETECTED
SOURCE: NORMAL
SPECIMEN DESCRIPTION: NORMAL

## 2024-11-30 PROCEDURE — 99211 OFF/OP EST MAY X REQ PHY/QHP: CPT

## 2024-11-30 PROCEDURE — 87636 SARSCOV2 & INF A&B AMP PRB: CPT

## 2024-11-30 PROCEDURE — 71046 X-RAY EXAM CHEST 2 VIEWS: CPT

## 2024-11-30 RX ORDER — GUAIFENESIN AND DEXTROMETHORPHAN HYDROBROMIDE 1200; 60 MG/1; MG/1
1 TABLET, EXTENDED RELEASE ORAL EVERY 12 HOURS PRN
Qty: 12 TABLET | Refills: 0 | Status: SHIPPED | OUTPATIENT
Start: 2024-11-30

## 2024-11-30 RX ORDER — PREDNISONE 20 MG/1
20 TABLET ORAL DAILY
Qty: 5 TABLET | Refills: 0 | Status: SHIPPED | OUTPATIENT
Start: 2024-11-30 | End: 2024-12-05

## 2024-11-30 ASSESSMENT — VISUAL ACUITY: OU: 1

## 2024-11-30 ASSESSMENT — PAIN SCALES - GENERAL: PAINLEVEL_OUTOF10: 0

## 2024-11-30 ASSESSMENT — PAIN - FUNCTIONAL ASSESSMENT: PAIN_FUNCTIONAL_ASSESSMENT: 0-10

## 2024-11-30 NOTE — ED PROVIDER NOTES
[facility] URGENT CARE  EMERGENCY DEPARTMENT ENCOUNTER        NAME: Traci Armstrong  :  1940  MRN:  [id number]  Date of evaluation: 2024  Provider: Chase Langford PA-C  PCP: Miguel Ángel Aguiar MD  Note Started : 11:54 AM EST 24    Chief Complaint: Cough (States she has a lot of mucous in her throat ) and Eye Problem (Watery eyes )      This is a 84-year-old female that presents to urgent care complaining of a cough and chest congestion and some URI symptoms.  She states when she coughs she has more tearing from her eyes but denies any loss of vision or blurred vision.  Denies abdominal pain urinary symptoms no diarrhea.  On first contact patient she appears to be in no acute distress.        Review of Systems  Pertinent positives and negatives are stated within HPI, all other systems reviewed and are negative.     Allergies: Aspirin, Propoxyphene, Venlafaxine hcl, Darvocet [propoxyphene n-acetaminophen], and Demerol     --------------------------------------------- PAST HISTORY ---------------------------------------------  Past Medical History:  has a past medical history of Depression, GERD (gastroesophageal reflux disease), Hiatal hernia, Hyperlipidemia, Hypertension, Pneumonia, and Sleep apnea.    Past Surgical History:  has a past surgical history that includes Tonsillectomy; hernia repair; bladder repair; Hysterectomy; Colonoscopy; Endoscopy, colon, diagnostic; skin biopsy; other surgical history (11/15/12); fracture surgery; laminectomy (Right, 2022); Pain management procedure (Left, 7/10/2023); Pain management procedure (N/A, 1/15/2024); and Pain management procedure (N/A, 2024).    Social History:  reports that she has never smoked. She has never used smokeless tobacco. She reports that she does not drink alcohol and does not use drugs.    Family History: family history is not on file.     The patient’s home medications have been reviewed.    The nursing notes within  Considerations (include 1 Tests not done, Admit vs D/C, Shared Decision Making, Pt Expectation of Test or Tx., Consults, Social Determinants, Chronic Conditiions, Records reviewed)    MDM  Number of Diagnoses or Management Options  URI with cough and congestion  Diagnosis management comments: This is a 84-year-old female that presents to urgent care complaining of a cough and chest congestion and some URI symptoms.  Patient no acute distress.  Chest x-ray was negative for acute process COVID and flu test were negative.  Does have some cough and URI symptoms probable viral in nature.  O2 sat within normal limits.  Has Flonase at home to take for sinus symptoms as Zyrtec at home for antihistamine.  Placed on Mucinex DM for cough and congestion.  Placed on steroids to help with bodyaches and fatigue.  Instructions given recommend close follow-up with primary care provider.         DISCHARGE MEDICATIONS:  New Prescriptions    DEXTROMETHORPHAN-GUAIFENESIN (MUCINEX DM MAXIMUM STRENGTH)  MG TB12    Take 1 tablet by mouth every 12 hours as needed (cough)    PREDNISONE (DELTASONE) 20 MG TABLET    Take 1 tablet by mouth daily for 5 days       DISCONTINUED MEDICATIONS:  Discontinued Medications    No medications on file       PATIENT REFERRED TO:  Miguel Ángel Augiar MD  [address] [address]  [phone]            --------------------------------- ADDITIONAL PROVIDER NOTES ---------------------------------  I have spoken with the patient and discussed today’s results, in addition to providing specific details for the plan of care and counseling regarding the diagnosis and prognosis.  Their questions are answered at this time and they are agreeable with the plan.   This patient is stable for discharge.  I have shared the specific conditions for return, as well as the importance of follow-up.      * NOTE: (Please note that portions of this note were completed with a voice recognition program.  Efforts

## 2025-02-07 ENCOUNTER — HOSPITAL ENCOUNTER (EMERGENCY)
Age: 85
Discharge: HOME | End: 2025-02-07
Payer: MEDICARE

## 2025-02-07 VITALS
BODY MASS INDEX: 23.91 KG/M2 | RESPIRATION RATE: 18 BRPM | OXYGEN SATURATION: 96 % | SYSTOLIC BLOOD PRESSURE: 138 MMHG | DIASTOLIC BLOOD PRESSURE: 88 MMHG | WEIGHT: 135 LBS | HEART RATE: 88 BPM | TEMPERATURE: 97.3 F

## 2025-02-07 DIAGNOSIS — J10.1 INFLUENZA A: Primary | ICD-10-CM

## 2025-02-07 LAB
FLUAV RNA RESP QL NAA+PROBE: DETECTED
FLUBV RNA RESP QL NAA+PROBE: NOT DETECTED
SARS-COV-2 RNA RESP QL NAA+PROBE: NOT DETECTED
SOURCE: ABNORMAL
SPECIMEN DESCRIPTION: ABNORMAL
SPECIMEN SOURCE: NORMAL
STREP A, MOLECULAR: NEGATIVE

## 2025-02-07 PROCEDURE — 87651 STREP A DNA AMP PROBE: CPT

## 2025-02-07 PROCEDURE — 87636 SARSCOV2 & INF A&B AMP PRB: CPT

## 2025-02-07 PROCEDURE — 99211 OFF/OP EST MAY X REQ PHY/QHP: CPT

## 2025-02-07 RX ORDER — ALBUTEROL SULFATE 90 UG/1
2 INHALANT RESPIRATORY (INHALATION) 4 TIMES DAILY PRN
Qty: 1 EACH | Refills: 0 | Status: SHIPPED | OUTPATIENT
Start: 2025-02-07

## 2025-02-07 RX ORDER — PREDNISONE 20 MG/1
20 TABLET ORAL DAILY
Qty: 5 TABLET | Refills: 0 | Status: SHIPPED | OUTPATIENT
Start: 2025-02-07 | End: 2025-02-12

## 2025-02-07 RX ORDER — OSELTAMIVIR PHOSPHATE 75 MG/1
75 CAPSULE ORAL 2 TIMES DAILY
Qty: 10 CAPSULE | Refills: 0 | Status: SHIPPED | OUTPATIENT
Start: 2025-02-07 | End: 2025-02-12

## 2025-02-07 RX ORDER — ONDANSETRON 4 MG/1
4 TABLET, ORALLY DISINTEGRATING ORAL 3 TIMES DAILY PRN
Qty: 15 TABLET | Refills: 0 | Status: SHIPPED | OUTPATIENT
Start: 2025-02-07

## 2025-02-07 ASSESSMENT — PAIN DESCRIPTION - ORIENTATION: ORIENTATION: RIGHT;LEFT

## 2025-02-07 ASSESSMENT — PAIN DESCRIPTION - DESCRIPTORS: DESCRIPTORS: ACHING;DISCOMFORT;DULL

## 2025-02-07 ASSESSMENT — PAIN DESCRIPTION - LOCATION: LOCATION: EYE;THROAT

## 2025-02-07 ASSESSMENT — PAIN SCALES - GENERAL: PAINLEVEL_OUTOF10: 10

## 2025-02-07 ASSESSMENT — PAIN - FUNCTIONAL ASSESSMENT: PAIN_FUNCTIONAL_ASSESSMENT: 0-10

## 2025-02-07 ASSESSMENT — PAIN DESCRIPTION - FREQUENCY: FREQUENCY: CONTINUOUS

## 2025-02-07 NOTE — ED PROVIDER NOTES
[facility] URGENT CARE  EMERGENCY DEPARTMENT ENCOUNTER        NAME: Traci Armstrong  :  1940  MRN:  [id number]  Date of evaluation: 2025  Provider: Chase Langford PA-C  PCP: Miguel Ángel Aguiar MD  Note Started : 3:48 PM EST 25    Chief Complaint: Pharyngitis (Sore throat & eyes hurt, coughing & sneezing, chills & feels warm. 2 days onset)      This is a 84-year-old female who presents to urgent care complaining of sore throat cough and cold symptoms for the past couple days.  She denies being lightheaded or dizzy or short of breath.  She denies abdominal pain there is been some nausea.  There is been some generalized weakness.  On first contact patient she appears to be in no acute distress.        Review of Systems  Pertinent positives and negatives are stated within HPI, all other systems reviewed and are negative.     Allergies: Aspirin, Propoxyphene, Venlafaxine hcl, Darvocet [propoxyphene n-acetaminophen], and Demerol     --------------------------------------------- PAST HISTORY ---------------------------------------------  Past Medical History:  has a past medical history of Depression, GERD (gastroesophageal reflux disease), Hiatal hernia, Hyperlipidemia, Hypertension, Pneumonia, and Sleep apnea.    Past Surgical History:  has a past surgical history that includes Tonsillectomy; hernia repair; bladder repair; Hysterectomy; Colonoscopy; Endoscopy, colon, diagnostic; skin biopsy; other surgical history (11/15/12); fracture surgery; laminectomy (Right, 2022); Pain management procedure (Left, 7/10/2023); Pain management procedure (N/A, 1/15/2024); and Pain management procedure (N/A, 2024).    Social History:  reports that she has never smoked. She has never used smokeless tobacco. She reports that she does not drink alcohol and does not use drugs.    Family History: family history is not on file.     The patient’s home medications have been reviewed.    The nursing notes within the ED

## 2025-02-17 ENCOUNTER — HOSPITAL ENCOUNTER (EMERGENCY)
Age: 85
Discharge: HOME | End: 2025-02-17
Payer: MEDICARE

## 2025-02-17 ENCOUNTER — APPOINTMENT (OUTPATIENT)
Dept: GENERAL RADIOLOGY | Age: 85
End: 2025-02-17
Payer: MEDICARE

## 2025-02-17 VITALS
SYSTOLIC BLOOD PRESSURE: 130 MMHG | TEMPERATURE: 97.7 F | DIASTOLIC BLOOD PRESSURE: 76 MMHG | WEIGHT: 129 LBS | RESPIRATION RATE: 20 BRPM | BODY MASS INDEX: 22.85 KG/M2 | HEART RATE: 109 BPM | OXYGEN SATURATION: 98 %

## 2025-02-17 DIAGNOSIS — J11.1 INFLUENZA: ICD-10-CM

## 2025-02-17 DIAGNOSIS — N30.00 ACUTE CYSTITIS WITHOUT HEMATURIA: Primary | ICD-10-CM

## 2025-02-17 LAB
BACTERIA URNS QL MICRO: ABNORMAL
BASOPHILS # BLD: 0.04 K/UL (ref 0–0.2)
BASOPHILS NFR BLD: 1 % (ref 0–2)
BILIRUB UR QL STRIP: NEGATIVE
BUN BLD-MCNC: 14 MG/DL (ref 6–23)
CHLORIDE BLD-SCNC: 102 MMOL/L (ref 100–108)
CLARITY UR: CLEAR
CO2 BLD CALC-SCNC: 26 MMOL/L (ref 22–29)
COLOR UR: YELLOW
CREAT BLD-MCNC: 0.7 MG/DL (ref 0.5–1)
EGFR, POC: 86 ML/MIN/1.73M2
EOSINOPHIL # BLD: 0.03 K/UL (ref 0.05–0.5)
EOSINOPHILS RELATIVE PERCENT: 1 % (ref 0–6)
EPI CELLS #/AREA URNS HPF: ABNORMAL /HPF
ERYTHROCYTE [DISTWIDTH] IN BLOOD BY AUTOMATED COUNT: 13 % (ref 11.5–15)
GLUCOSE BLD-MCNC: 110 MG/DL (ref 74–99)
GLUCOSE UR STRIP-MCNC: NEGATIVE MG/DL
HCT VFR BLD AUTO: 42.2 % (ref 34–48)
HGB BLD-MCNC: 14.3 G/DL (ref 11.5–15.5)
HGB UR QL STRIP.AUTO: NEGATIVE
IMM GRANULOCYTES # BLD AUTO: 0.04 K/UL (ref 0–0.58)
IMM GRANULOCYTES NFR BLD: 1 % (ref 0–5)
KETONES UR STRIP-MCNC: NEGATIVE MG/DL
LEUKOCYTE ESTERASE UR QL STRIP: ABNORMAL
LYMPHOCYTES NFR BLD: 1.22 K/UL (ref 1.5–4)
LYMPHOCYTES RELATIVE PERCENT: 21 % (ref 20–42)
MCH RBC QN AUTO: 29.1 PG (ref 26–35)
MCHC RBC AUTO-ENTMCNC: 33.9 G/DL (ref 32–34.5)
MCV RBC AUTO: 85.8 FL (ref 80–99.9)
MONOCYTES NFR BLD: 0.65 K/UL (ref 0.1–0.95)
MONOCYTES NFR BLD: 11 % (ref 2–12)
NEUTROPHILS NFR BLD: 66 % (ref 43–80)
NEUTS SEG NFR BLD: 3.88 K/UL (ref 1.8–7.3)
NITRITE UR QL STRIP: NEGATIVE
PH UR STRIP: 6.5 [PH] (ref 5–8)
PLATELET # BLD AUTO: 314 K/UL (ref 130–450)
PMV BLD AUTO: 9.6 FL (ref 7–12)
POC ANION GAP: 10 MMOL/L (ref 7–16)
POTASSIUM BLD-SCNC: 3.8 MMOL/L (ref 3.5–5)
PROT UR STRIP-MCNC: 30 MG/DL
RBC # BLD AUTO: 4.92 M/UL (ref 3.5–5.5)
RBC #/AREA URNS HPF: ABNORMAL /HPF
SARS-COV-2 RDRP RESP QL NAA+PROBE: NOT DETECTED
SODIUM BLD-SCNC: 138 MMOL/L (ref 132–146)
SP GR UR STRIP: 1.02 (ref 1–1.03)
SPECIMEN DESCRIPTION: NORMAL
UROBILINOGEN UR STRIP-ACNC: 0.2 EU/DL (ref 0–1)
WBC #/AREA URNS HPF: ABNORMAL /HPF
WBC OTHER # BLD: 5.9 K/UL (ref 4.5–11.5)

## 2025-02-17 PROCEDURE — 82565 ASSAY OF CREATININE: CPT

## 2025-02-17 PROCEDURE — 6370000000 HC RX 637 (ALT 250 FOR IP): Performed by: PHYSICIAN ASSISTANT

## 2025-02-17 PROCEDURE — 80051 ELECTROLYTE PANEL: CPT

## 2025-02-17 PROCEDURE — 71046 X-RAY EXAM CHEST 2 VIEWS: CPT

## 2025-02-17 PROCEDURE — 81001 URINALYSIS AUTO W/SCOPE: CPT

## 2025-02-17 PROCEDURE — 85025 COMPLETE CBC W/AUTO DIFF WBC: CPT

## 2025-02-17 PROCEDURE — 99211 OFF/OP EST MAY X REQ PHY/QHP: CPT

## 2025-02-17 PROCEDURE — 82947 ASSAY GLUCOSE BLOOD QUANT: CPT

## 2025-02-17 PROCEDURE — 87635 SARS-COV-2 COVID-19 AMP PRB: CPT

## 2025-02-17 PROCEDURE — 36415 COLL VENOUS BLD VENIPUNCTURE: CPT

## 2025-02-17 PROCEDURE — 87086 URINE CULTURE/COLONY COUNT: CPT

## 2025-02-17 PROCEDURE — 84520 ASSAY OF UREA NITROGEN: CPT

## 2025-02-17 RX ORDER — ONDANSETRON 4 MG/1
4 TABLET, ORALLY DISINTEGRATING ORAL ONCE
Status: COMPLETED | OUTPATIENT
Start: 2025-02-17 | End: 2025-02-17

## 2025-02-17 RX ORDER — ONDANSETRON 4 MG/1
4 TABLET, ORALLY DISINTEGRATING ORAL 3 TIMES DAILY PRN
Qty: 12 TABLET | Refills: 0 | Status: SHIPPED | OUTPATIENT
Start: 2025-02-17

## 2025-02-17 RX ORDER — CEPHALEXIN 500 MG/1
500 CAPSULE ORAL 3 TIMES DAILY
Qty: 21 CAPSULE | Refills: 0 | Status: SHIPPED | OUTPATIENT
Start: 2025-02-17 | End: 2025-02-24

## 2025-02-17 RX ADMIN — ONDANSETRON 4 MG: 4 TABLET, ORALLY DISINTEGRATING ORAL at 13:17

## 2025-02-17 ASSESSMENT — PAIN - FUNCTIONAL ASSESSMENT: PAIN_FUNCTIONAL_ASSESSMENT: 0-10

## 2025-02-17 ASSESSMENT — PAIN SCALES - GENERAL: PAINLEVEL_OUTOF10: 0

## 2025-02-17 NOTE — ED PROVIDER NOTES
[facility] URGENT [facility]ARE  EMERGENCY DEPARTMENT ENCOUNTER        NAME: Traci Armstrong  :  1940  MRN:  [id number]  Date of evaluation: 2025  Provider: Chase Langford PA-C  PCP: Miguel Ángel Aguiar MD  Note Started : 11:55 AM EST 25    Chief Complaint: Vomiting (Vomiting, weakness, sob for approx a week. Pt stated she prev was dx with flu A)      This is an 84-year-old female who presents to urgent care with her friend.  Patient was diagnosed with the flu on 2025 she was placed on Tamiflu and inhaler steroid and Zofran.  She states she ran out of the Zofran and for the past couple days she has been having some nausea and vomiting also she has been having some diarrhea as well she does complain of a continued cough.  There are some shortness of breath.  There has been some urinary frequency she does complain of some bodyaches and weakness.  On first contact patient she appears to be in no acute distress.        Review of Systems  Pertinent positives and negatives are stated within HPI, all other systems reviewed and are negative.     Allergies: Aspirin, Propoxyphene, Venlafaxine hcl, Darvocet [propoxyphene n-acetaminophen], and Demerol     --------------------------------------------- PAST HISTORY ---------------------------------------------  Past Medical History:  has a past medical history of Depression, GERD (gastroesophageal reflux disease), Hiatal hernia, Hyperlipidemia, Hypertension, Pneumonia, and Sleep apnea.    Past Surgical History:  has a past surgical history that includes Tonsillectomy; hernia repair; bladder repair; Hysterectomy; Colonoscopy; Endoscopy, colon, diagnostic; skin biopsy; other surgical history (11/15/12); fracture surgery; laminectomy (Right, 2022); Pain management procedure (Left, 7/10/2023); Pain management procedure (N/A, 1/15/2024); and Pain management procedure (N/A, 2024).    Social History:  reports that she has never smoked. She has never used smokeless

## 2025-02-19 ENCOUNTER — APPOINTMENT (OUTPATIENT)
Dept: CT IMAGING | Age: 85
End: 2025-02-19
Payer: MEDICARE

## 2025-02-19 ENCOUNTER — HOSPITAL ENCOUNTER (EMERGENCY)
Age: 85
Discharge: HOME | End: 2025-02-19
Attending: EMERGENCY MEDICINE
Payer: MEDICARE

## 2025-02-19 VITALS
RESPIRATION RATE: 16 BRPM | TEMPERATURE: 98.6 F | HEART RATE: 85 BPM | DIASTOLIC BLOOD PRESSURE: 74 MMHG | SYSTOLIC BLOOD PRESSURE: 137 MMHG | OXYGEN SATURATION: 100 %

## 2025-02-19 DIAGNOSIS — N39.0 URINARY TRACT INFECTION WITHOUT HEMATURIA, SITE UNSPECIFIED: Primary | ICD-10-CM

## 2025-02-19 LAB
ALBUMIN SERPL-MCNC: 4.2 G/DL (ref 3.5–5.2)
ALP SERPL-CCNC: 81 U/L (ref 35–104)
ALT SERPL-CCNC: 28 U/L (ref 0–32)
ANION GAP SERPL CALCULATED.3IONS-SCNC: 12 MMOL/L (ref 7–16)
AST SERPL-CCNC: 29 U/L (ref 0–31)
BASOPHILS # BLD: 0.05 K/UL (ref 0–0.2)
BASOPHILS NFR BLD: 1 % (ref 0–2)
BILIRUB SERPL-MCNC: 0.4 MG/DL (ref 0–1.2)
BILIRUB UR QL STRIP: NEGATIVE
BUN SERPL-MCNC: 14 MG/DL (ref 6–23)
CALCIUM SERPL-MCNC: 9.3 MG/DL (ref 8.6–10.2)
CHLORIDE SERPL-SCNC: 100 MMOL/L (ref 98–107)
CLARITY UR: CLEAR
CO2 SERPL-SCNC: 26 MMOL/L (ref 22–29)
COLOR UR: YELLOW
CREAT SERPL-MCNC: 0.7 MG/DL (ref 0.5–1)
CRYSTALS URNS MICRO: ABNORMAL /HPF
EOSINOPHIL # BLD: 0.1 K/UL (ref 0.05–0.5)
EOSINOPHILS RELATIVE PERCENT: 1 % (ref 0–6)
ERYTHROCYTE [DISTWIDTH] IN BLOOD BY AUTOMATED COUNT: 12.8 % (ref 11.5–15)
FLUAV RNA RESP QL NAA+PROBE: NOT DETECTED
FLUBV RNA RESP QL NAA+PROBE: NOT DETECTED
GFR, ESTIMATED: 86 ML/MIN/1.73M2
GLUCOSE SERPL-MCNC: 93 MG/DL (ref 74–99)
GLUCOSE UR STRIP-MCNC: NEGATIVE MG/DL
HCT VFR BLD AUTO: 40.6 % (ref 34–48)
HGB BLD-MCNC: 13.8 G/DL (ref 11.5–15.5)
HGB UR QL STRIP.AUTO: NEGATIVE
IMM GRANULOCYTES # BLD AUTO: 0.03 K/UL (ref 0–0.58)
IMM GRANULOCYTES NFR BLD: 0 % (ref 0–5)
KETONES UR STRIP-MCNC: ABNORMAL MG/DL
LACTATE BLDV-SCNC: 0.9 MMOL/L (ref 0.5–1.9)
LEUKOCYTE ESTERASE UR QL STRIP: ABNORMAL
LYMPHOCYTES NFR BLD: 2.1 K/UL (ref 1.5–4)
LYMPHOCYTES RELATIVE PERCENT: 30 % (ref 20–42)
MCH RBC QN AUTO: 28.6 PG (ref 26–35)
MCHC RBC AUTO-ENTMCNC: 34 G/DL (ref 32–34.5)
MCV RBC AUTO: 84.2 FL (ref 80–99.9)
MICROORGANISM SPEC CULT: NO GROWTH
MONOCYTES NFR BLD: 0.74 K/UL (ref 0.1–0.95)
MONOCYTES NFR BLD: 11 % (ref 2–12)
NEUTROPHILS NFR BLD: 57 % (ref 43–80)
NEUTS SEG NFR BLD: 3.91 K/UL (ref 1.8–7.3)
NITRITE UR QL STRIP: POSITIVE
PH UR STRIP: 5.5 [PH] (ref 5–8)
PLATELET # BLD AUTO: 298 K/UL (ref 130–450)
PMV BLD AUTO: 10.1 FL (ref 7–12)
POTASSIUM SERPL-SCNC: 3.1 MMOL/L (ref 3.5–5)
PROT SERPL-MCNC: 6.9 G/DL (ref 6.4–8.3)
PROT UR STRIP-MCNC: NEGATIVE MG/DL
RBC # BLD AUTO: 4.82 M/UL (ref 3.5–5.5)
RBC #/AREA URNS HPF: ABNORMAL /HPF
SARS-COV-2 RNA RESP QL NAA+PROBE: NOT DETECTED
SODIUM SERPL-SCNC: 138 MMOL/L (ref 132–146)
SOURCE: NORMAL
SP GR UR STRIP: 1.02 (ref 1–1.03)
SPECIMEN DESCRIPTION: NORMAL
SPECIMEN DESCRIPTION: NORMAL
TROPONIN I SERPL HS-MCNC: 13 NG/L (ref 0–9)
UROBILINOGEN UR STRIP-ACNC: 0.2 EU/DL (ref 0–1)
WBC #/AREA URNS HPF: ABNORMAL /HPF
WBC OTHER # BLD: 6.9 K/UL (ref 4.5–11.5)

## 2025-02-19 PROCEDURE — 6360000004 HC RX CONTRAST MEDICATION: Performed by: RADIOLOGY

## 2025-02-19 PROCEDURE — 99285 EMERGENCY DEPT VISIT HI MDM: CPT

## 2025-02-19 PROCEDURE — 81001 URINALYSIS AUTO W/SCOPE: CPT

## 2025-02-19 PROCEDURE — 80053 COMPREHEN METABOLIC PANEL: CPT

## 2025-02-19 PROCEDURE — 83605 ASSAY OF LACTIC ACID: CPT

## 2025-02-19 PROCEDURE — 87636 SARSCOV2 & INF A&B AMP PRB: CPT

## 2025-02-19 PROCEDURE — 70450 CT HEAD/BRAIN W/O DYE: CPT

## 2025-02-19 PROCEDURE — 87086 URINE CULTURE/COLONY COUNT: CPT

## 2025-02-19 PROCEDURE — 87040 BLOOD CULTURE FOR BACTERIA: CPT

## 2025-02-19 PROCEDURE — 74177 CT ABD & PELVIS W/CONTRAST: CPT

## 2025-02-19 PROCEDURE — 85025 COMPLETE CBC W/AUTO DIFF WBC: CPT

## 2025-02-19 PROCEDURE — 6370000000 HC RX 637 (ALT 250 FOR IP): Performed by: EMERGENCY MEDICINE

## 2025-02-19 PROCEDURE — 84484 ASSAY OF TROPONIN QUANT: CPT

## 2025-02-19 RX ORDER — IOPAMIDOL 755 MG/ML
75 INJECTION, SOLUTION INTRAVASCULAR
Status: COMPLETED | OUTPATIENT
Start: 2025-02-19 | End: 2025-02-19

## 2025-02-19 RX ORDER — POTASSIUM CHLORIDE 1500 MG/1
40 TABLET, EXTENDED RELEASE ORAL ONCE
Status: COMPLETED | OUTPATIENT
Start: 2025-02-19 | End: 2025-02-19

## 2025-02-19 RX ADMIN — IOPAMIDOL 75 ML: 755 INJECTION, SOLUTION INTRAVENOUS at 14:28

## 2025-02-19 RX ADMIN — POTASSIUM CHLORIDE 40 MEQ: 1500 TABLET, EXTENDED RELEASE ORAL at 14:56

## 2025-02-19 ASSESSMENT — ENCOUNTER SYMPTOMS: CHEST TIGHTNESS: 0

## 2025-02-19 NOTE — ED PROVIDER NOTES
84-year-old female presenting with concern about fogginess to her head.  She says that she had influenza couple weeks ago.  That got better, had some diarrhea and vomiting and that got better.  She went to an urgent care few days ago, stated that she was given Keflex and Zofran for urine infection.  She says she is not feeling any better and now feels a fogginess to her head.  On arrival she is awake, alert, oriented x 4.  She is not in any distress, no rashes, no urticaria, no evidence of allergic reaction happening.         History reviewed. No pertinent family history.  Past Surgical History:   Procedure Laterality Date    BLADDER REPAIR      x3    COLONOSCOPY      ENDOSCOPY, COLON, DIAGNOSTIC      FRACTURE SURGERY      right ankle    HERNIA REPAIR      HYSTERECTOMY (CERVIX STATUS UNKNOWN)      LAMINECTOMY Right 11/21/2022    L4-L5 LAMINECTOMY AND RIGHT L4-L5 DISCECTOMY performed by Shahida Abbott MD at [facility]    OTHER SURGICAL HISTORY  11/15/12    tenovaginotomy left ring finger    PAIN MANAGEMENT PROCEDURE Left 7/10/2023    Left L2 and L3 epidural steroid injection. SEDATION performed by Klever Dc MD at [facility] OR    PAIN MANAGEMENT PROCEDURE N/A 1/15/2024    Spinal cord stimulator trial with [place] performed by Klever Dc MD at [facility] OR    PAIN MANAGEMENT PROCEDURE N/A 2/23/2024    Spinal cord stimulator implant with [place] performed by Klever Dc MD at [facility] OR    SKIN BIOPSY      TONSILLECTOMY         Review of Systems   Constitutional:  Negative for chills and fever.   Respiratory:  Negative for chest tightness.    Neurological:         \"Fogginess\" to the head          Physical Exam  Constitutional:       General: She is not in acute distress.     Appearance: She is well-developed.   HENT:      Head: Normocephalic and atraumatic.   Eyes:      Conjunctiva/sclera: Conjunctivae normal.      Pupils: Pupils are equal, round, and reactive to light.   Neck:      Thyroid: No thyromegaly.

## 2025-02-20 LAB
MICROORGANISM SPEC CULT: NO GROWTH
SERVICE CMNT-IMP: NORMAL
SPECIMEN DESCRIPTION: NORMAL

## 2025-02-23 LAB
MICROORGANISM SPEC CULT: NORMAL
MICROORGANISM SPEC CULT: NORMAL
SERVICE CMNT-IMP: NORMAL
SERVICE CMNT-IMP: NORMAL
SPECIMEN DESCRIPTION: NORMAL
SPECIMEN DESCRIPTION: NORMAL

## 2025-02-24 LAB
MICROORGANISM SPEC CULT: NORMAL
MICROORGANISM SPEC CULT: NORMAL
SERVICE CMNT-IMP: NORMAL
SERVICE CMNT-IMP: NORMAL
SPECIMEN DESCRIPTION: NORMAL
SPECIMEN DESCRIPTION: NORMAL

## (undated) DEVICE — ADHESIVE SKIN CLOSURE TOP 36 CC HI VISC DERMBND MINI

## (undated) DEVICE — APPLICATOR PREP 26ML 0.7% IOD POVACRYLEX 74% ISO ALC ST

## (undated) DEVICE — PREP TRAY 10X5X2: Brand: MEDLINE INDUSTRIES, INC.

## (undated) DEVICE — DRAPE,REIN 53X77,STERILE: Brand: MEDLINE

## (undated) DEVICE — NEEDLE HYPO 25GA L1.5IN BLU POLYPR HUB S STL REG BVL STR

## (undated) DEVICE — Z INACTIVE NO ACTIVE SUPPLIER APPLICATOR MEDICATED 26 CC TINT HI-LITE ORNG STRL CHLORAPREP

## (undated) DEVICE — 6 ML SYRINGE LUER-LOCK TIP: Brand: MONOJECT

## (undated) DEVICE — 3M™ IOBAN™ 2 ANTIMICROBIAL INCISE DRAPE 6650EZ: Brand: IOBAN™ 2

## (undated) DEVICE — GAUZE,SPONGE,4"X4",16PLY,XRAY,STRL,LF: Brand: MEDLINE

## (undated) DEVICE — BLADE,STAINLESS-STEEL,10,STRL,DISPOSABLE: Brand: MEDLINE

## (undated) DEVICE — TRAY EPI SGL DOSE 18GA NDL CUST AULTMAN HOSP

## (undated) DEVICE — 1810 FOAM BLOCK NEEDLE COUNTER: Brand: DEVON

## (undated) DEVICE — GAUZE,SPONGE,4"X4",12PLY,STERILE,LF,2'S: Brand: MEDLINE

## (undated) DEVICE — 3M™ TEGADERM™ TRANSPARENT FILM DRESSING FRAME STYLE, 1628, 6 IN X 8 IN (15 CM X 20 CM), 10/CT 8CT/CASE: Brand: 3M™ TEGADERM™

## (undated) DEVICE — REMOTE CONTROL KIT: Brand: FREELINK™

## (undated) DEVICE — CABLE SURG L60CM EXTN NEUROMODULATION PRECIS SPECTR

## (undated) DEVICE — NEEDLE SPNL L3.5IN PNK HUB S STL REG WALL FIT STYL W/ QNCKE

## (undated) DEVICE — DRESSING TRNSPAR W6XL8IN FLM SURESITE 123

## (undated) DEVICE — SYRINGE MED 10ML TRNSLUC BRL PLUNG BLK MRK POLYPR CTRL

## (undated) DEVICE — DEVICE SUT W/ DEL TOOL KNOT PUSH DISP FIXATE

## (undated) DEVICE — Z INACTIVE USE 2855128 SPONGE GZ 16 PLY WVN COT 4INX4IN  HHH

## (undated) DEVICE — PACK,UNIVERSAL,NO GOWNS: Brand: MEDLINE

## (undated) DEVICE — MAGNETIC INSTR DRAPE 20X16: Brand: MEDLINE INDUSTRIES, INC.

## (undated) DEVICE — TOWEL,OR,DSP,ST,BLUE,STD,6/PK,12PK/CS: Brand: MEDLINE

## (undated) DEVICE — GLOVE ORANGE PI 8   MSG9080

## (undated) DEVICE — APPLICATOR MEDICATED 10.5 CC SOLUTION HI LT ORNG CHLORAPREP

## (undated) DEVICE — GLOVE ORANGE PI 7   MSG9070

## (undated) DEVICE — Z INACTIVE USE 2863041 SPONGE GZ W4XL4IN 100% COT 16 PLY RADPQ HIGHLY ABSRB

## (undated) DEVICE — WIPES SKIN CLOTH READYPREP 9 X 10.5 IN 2% CHLORHEX GLUCONATE CHG PREOP

## (undated) DEVICE — GARMENT,MEDLINE,DVT,INT,CALF,MED, GEN2: Brand: MEDLINE

## (undated) DEVICE — BANDAGE ADH W1XL3IN NAT FAB WVN FLX DURABLE N ADH PD SEAL

## (undated) DEVICE — ELECTRODE PT RET AD L9FT HI MOIST COND ADH HYDRGEL CORDED

## (undated) DEVICE — NEEDLE HYPO 18GA L1.5IN PNK POLYPR HUB S STL THN WALL FILL

## (undated) DEVICE — ADHESIVE DERMABOND TOPICAL SKIN MINI .36ML

## (undated) DEVICE — CABLE NEUROSTIM EXTN 1X16 L213CM OR PRECIS SPECTR

## (undated) DEVICE — JACKSON TABLE POSITIONER KIT: Brand: MEDLINE INDUSTRIES, INC.

## (undated) DEVICE — BINDER ABD UNISX 4 PNL PREM 6INX6INX12IN L XL 4

## (undated) DEVICE — READY WET SKIN SCRUB TRAY-LF: Brand: MEDLINE INDUSTRIES, INC.

## (undated) DEVICE — Device

## (undated) DEVICE — SUTURE PERMA-HAND SZ 2-0 L30IN NONABSORBABLE BLK L26MM SH K833H

## (undated) DEVICE — BLADE ES L6IN ELASTOMERIC COAT EXT DURABLE BEND UPTO 90DEG

## (undated) DEVICE — MARKER,SKIN,WI/RULER AND LABELS: Brand: MEDLINE

## (undated) DEVICE — GLOVE ORANGE PI 7 1/2   MSG9075

## (undated) DEVICE — GLOVE SURG 8.5 PF POLYMER WHT STRL SIGN LTX ESSENTIAL LTX

## (undated) DEVICE — SYRINGE IRRIG 60ML SFT PLIABLE BLB EZ TO GRP 1 HND USE W/

## (undated) DEVICE — TUBING SUCT 12FR MAL ALUM SHFT FN CAP VENT UNIV CONN W/ OBT

## (undated) DEVICE — 4-PORT MANIFOLD: Brand: NEPTUNE 2

## (undated) DEVICE — SYRINGE 20ML LL S/C 50

## (undated) DEVICE — 5.0MM PRECISION ROUND

## (undated) DEVICE — NEEDLE EPIDURAL CURVED 14 GA COUD

## (undated) DEVICE — GOWN,SIRUS,NONRNF,SETINSLV,XL,20/CS: Brand: MEDLINE

## (undated) DEVICE — APPLICATOR MEDICATED 26 CC SOLUTION HI LT ORNG CHLORAPREP

## (undated) DEVICE — CODMAN® SURGICAL PATTIES 1/2" X 1" (1.27CM X 2.54CM): Brand: CODMAN®

## (undated) DEVICE — TUBING, SUCTION, 3/16" X 12', STRAIGHT: Brand: MEDLINE

## (undated) DEVICE — RX COUDÉ® EPIDURAL NEEDLE 14G TW X 4.0": Brand: EPIMED

## (undated) DEVICE — BASIC DOUBLE BASIN 2-LF: Brand: MEDLINE INDUSTRIES, INC.

## (undated) DEVICE — WRENCH SURG L76CM SPNL CRD HEX STIM SYS SURG EQUIP PRECIS

## (undated) DEVICE — DRAPE SHEET, X-LARGE: Brand: CONVERTORS

## (undated) DEVICE — Z DISCONTINUED NEEDLE HYPO 27GA L1.25IN GRY POLYPR HUB S STL REG BVL STR

## (undated) DEVICE — DRAPE 64X41IN RADIOLOGY C ARM EQUIP STER

## (undated) DEVICE — LAPAROTOMY PACK WITH POLYREINFORCED GOWNS: Brand: CONVERTORS

## (undated) DEVICE — NDL CNTR 40CT FM MAG: Brand: MEDLINE INDUSTRIES, INC.

## (undated) DEVICE — SHEET,DRAPE,70X100,STERILE: Brand: MEDLINE

## (undated) DEVICE — GOWN,SIRUS,FABRNF,XL,20/CS: Brand: MEDLINE

## (undated) DEVICE — NEEDLE HYPO 27GA L1.25IN GRY POLYPR HUB S STL REG BVL STR

## (undated) DEVICE — 12 ML SYRINGE,LUER-LOCK TIP: Brand: MONOJECT

## (undated) DEVICE — TUBING, SUCTION, 1/4" X 10', STRAIGHT: Brand: MEDLINE

## (undated) DEVICE — Z DISCONTINUED USE 2132709 NEEDLE HYPO 18GA L1.5IN PNK POLYPR HUB S STL THN WALL FILL

## (undated) DEVICE — 3M(TM) MEDIPORE(TM) +PAD SOFT CLOTH ADHESIVE WOUND DRESSING 3569: Brand: 3M™ MEDIPORE™

## (undated) DEVICE — 1530S-1, 1 IN X 1.5 YD (2,5 CM X 1,37 M), UNPACKAGED ROLLS, 100 RL/CARTON, 5 CARTONS/CASE, 500 RL/CA: Brand: 3M™ MICROPORE™

## (undated) DEVICE — GLOVE SURG SZ 65 THK91MIL LTX FREE SYN POLYISOPRENE

## (undated) DEVICE — YANKAUER,BULB TIP,W/O VENT,RIGID,STERILE: Brand: MEDLINE

## (undated) DEVICE — COVER,LIGHT HANDLE,FLX,1/PK: Brand: MEDLINE INDUSTRIES, INC.

## (undated) DEVICE — LUMBAR LAMINECTOMY: Brand: MEDLINE INDUSTRIES, INC.

## (undated) DEVICE — DRESSING TRNSPAR W5XL4.5IN FLM SHT SEMIPERMEABLE WIND

## (undated) DEVICE — GAUZE,SPONGE,4"X4",16PLY,STRL,LF,10/TRAY: Brand: MEDLINE

## (undated) DEVICE — CHARGING SYSTEM KIT: Brand: PRECISION™

## (undated) DEVICE — 3M™ RED DOT™ MONITORING ELECTRODE WITH FOAM TAPE AND STICKY GEL 2560, 50/BAG, 20/CASE, 72/PLT: Brand: RED DOT™

## (undated) DEVICE — 3 ML SYRINGE LUER-LOCK TIP: Brand: MONOJECT

## (undated) DEVICE — HYPODERMIC SAFETY NEEDLE: Brand: MAGELLAN

## (undated) DEVICE — NEEDLE SPNL 25GA L3.5IN BLU HUB S STL PNCL PNT W/O INTRO

## (undated) DEVICE — GOWN SURG XL SMS FAB NONREINFORCED RAGLAN SLV HK LOOP CLSR

## (undated) DEVICE — SHEET,DRAPE,40X58,STERILE: Brand: MEDLINE

## (undated) DEVICE — 3M™ IOBAN™ 2 ANTIMICROBIAL INCISE DRAPE 6640EZ: Brand: IOBAN™ 2

## (undated) DEVICE — TOWEL OR BLUEE 16X26IN ST 8 PACK ORB08 16X26ORTWL